# Patient Record
Sex: FEMALE | Race: WHITE | Employment: OTHER | URBAN - METROPOLITAN AREA
[De-identification: names, ages, dates, MRNs, and addresses within clinical notes are randomized per-mention and may not be internally consistent; named-entity substitution may affect disease eponyms.]

---

## 2017-02-22 ENCOUNTER — TRANSFERRED RECORDS (OUTPATIENT)
Dept: HEALTH INFORMATION MANAGEMENT | Facility: CLINIC | Age: 57
End: 2017-02-22

## 2017-03-06 ENCOUNTER — HOSPITAL ENCOUNTER (EMERGENCY)
Facility: HOSPITAL | Age: 57
Discharge: HOME | End: 2017-03-06
Attending: NURSE PRACTITIONER | Admitting: NURSE PRACTITIONER
Payer: COMMERCIAL

## 2017-03-06 VITALS
SYSTOLIC BLOOD PRESSURE: 156 MMHG | OXYGEN SATURATION: 98 % | RESPIRATION RATE: 12 BRPM | HEART RATE: 59 BPM | TEMPERATURE: 97.7 F | DIASTOLIC BLOOD PRESSURE: 98 MMHG

## 2017-03-06 DIAGNOSIS — J06.9 VIRAL UPPER RESPIRATORY TRACT INFECTION: ICD-10-CM

## 2017-03-06 DIAGNOSIS — J02.0 ACUTE STREPTOCOCCAL PHARYNGITIS: ICD-10-CM

## 2017-03-06 LAB
DEPRECATED S PYO AG THROAT QL EIA: NORMAL
MICRO REPORT STATUS: NORMAL
SPECIMEN SOURCE: NORMAL

## 2017-03-06 PROCEDURE — 87081 CULTURE SCREEN ONLY: CPT | Performed by: NURSE PRACTITIONER

## 2017-03-06 PROCEDURE — 99213 OFFICE O/P EST LOW 20 MIN: CPT

## 2017-03-06 PROCEDURE — 99213 OFFICE O/P EST LOW 20 MIN: CPT | Performed by: NURSE PRACTITIONER

## 2017-03-06 PROCEDURE — 87880 STREP A ASSAY W/OPTIC: CPT | Performed by: NURSE PRACTITIONER

## 2017-03-06 RX ORDER — AZITHROMYCIN 250 MG/1
TABLET, FILM COATED ORAL
Qty: 6 TABLET | Refills: 0 | Status: SHIPPED | OUTPATIENT
Start: 2017-03-06 | End: 2018-09-04

## 2017-03-06 ASSESSMENT — ENCOUNTER SYMPTOMS
FEVER: 0
COUGH: 1
VOICE CHANGE: 1
SORE THROAT: 1
NAUSEA: 0
DIARRHEA: 0
STRIDOR: 0
VOMITING: 0
WHEEZING: 0
SINUS PRESSURE: 0
SHORTNESS OF BREATH: 0
RHINORRHEA: 0
CHILLS: 0
ACTIVITY CHANGE: 0
DYSURIA: 0
APPETITE CHANGE: 0
TROUBLE SWALLOWING: 0

## 2017-03-06 NOTE — ED NOTES
"Patient had a \"scope to look at her vocal cords\" on Feb 24th. She notes increased irritation since then.   "

## 2017-03-06 NOTE — ED AVS SNAPSHOT
[facility]    [address]    [place] [address]    Phone:  [phone]                                       Kelly Vargas   MRN: [id number]    Department:  [facility]   Date of Visit:  3/6/2017           Patient Information     Date Of Birth          1960        Your diagnoses for this visit were:     Acute streptococcal pharyngitis        You were seen by Kirti Gibson NP.      Follow-up Information     Follow up with Bhargav Doshi MD.    Specialty:  Family Practice    Why:  As needed, If symptoms worsen    Contact information:    [facility]  [address]  [place] [address] [phone]          Follow up with [facility].    Specialty:  EMERGENCY MEDICINE    Why:  As needed, If symptoms worsen    Contact information:    [address]  [place] [address] [phone]    Additional information:    From [place]: Take [address]. Turn left at [address]. Turn left at the first stoplight on [address]. At the first stop sign, take a right onto [address]. Take a left into the parking lot and continue through until you reach the North enterance of the building.       From [place]: Take [address]. Take the [address] ramp towards [place]. Turn left onto [address]. Take a slight right onto [address]. Turn left at the first stoplight on [address]. At the first stop sign, take a right onto [address]. Take a left into the parking lot and continue through until you reach the North enterance of the building.       From [place]: Take [address]. Take a right at [address]. At the first stop sign, take a right onto [address]. Take a left into the parking lot and continue through until you reach the North enterance of the building.         Discharge Instructions       Take antibiotics as ordered.   Eat a yogurt a day while taking  antibiotics.   Increase fluid intake.   Take tylenol and or ibuprofen for fever or discomfort.   Continue to work on quitting smoking.   Follow up with ENT as scheduled. Sooner with an increase in symptoms or concerns.   Return to urgent care or emergency department with any increase in symptoms or concerns.     Discharge References/Attachments     SORE THROATS, SELF-CARE FOR (ENGLISH)    SORE THROAT, WHEN YOU HAVE A (ENGLISH)    PHARYNGITIS, STREP (PRESUMED) (ENGLISH)         Review of your medicines      START taking        Dose / Directions Last dose taken    azithromycin 250 MG tablet   Commonly known as:  ZITHROMAX Z-HUNTER   Quantity:  6 tablet        Two tablets on the first day, then one tablet daily for the next 4 days   Refills:  0          Our records show that you are taking the medicines listed below. If these are incorrect, please call your family doctor or clinic.        Dose / Directions Last dose taken    aspirin 81 MG tablet        Take  by mouth daily.   Refills:  0        cholecalciferol 5000 UNITS Caps capsule   Commonly known as:  vitamin D3   Dose:  1 capsule        Take 1 capsule by mouth daily   Refills:  0        COPAXONE 20 MG/ML injection   Quantity:  30 each   Generic drug:  glatiramer        Inject subcutaneously 20mg  once daily   Refills:  12        levothyroxine 100 MCG tablet   Commonly known as:  SYNTHROID/LEVOTHROID   Quantity:  30 tablet        Take 1 tablet by mouth  daily   Refills:  0        methylphenidate 10 MG tablet   Commonly known as:  RITALIN   Dose:  10 mg   Quantity:  60 tablet        Take 1 tablet (10 mg) by mouth 2 times daily   Refills:  0        OMEPRAZOLE PO        Refills:  0        sertraline 50 MG tablet   Commonly known as:  ZOLOFT   Quantity:  7 tablet        Take 1 tablet by mouth  daily   Refills:  0        simvastatin 80 MG tablet   Commonly known as:  ZOCOR   Quantity:  30 tablet        Take 1 tablet by mouth at  bedtime   Refills:  0               "  Prescriptions were sent or printed at these locations (1 Prescription)                   [facility] - [place], [place] - [address]   [address], [place] [address]    Telephone:  [phone]   Fax:  [phone]   Hours:                  E-Prescribed (1 of 1)         azithromycin (ZITHROMAX Z-HUNTER) 250 MG tablet                Procedures and tests performed during your visit     Beta strep group A culture    Rapid strep screen      Orders Needing Specimen Collection     None      Pending Results     Date and Time Order Name Status Description    3/6/2017 1422 Beta strep group A culture In process             Pending Culture Results     Date and Time Order Name Status Description    3/6/2017 1422 Beta strep group A culture In process             Thank you for choosing [facility]       Thank you for choosing [facility] for your care. Our goal is always to provide you with excellent care. Hearing back from our patients is one way we can continue to improve our services. Please take a few minutes to complete the written survey that you may receive in the mail after you visit with us. Thank you!        [employer] Information     [employer] lets you send messages to your doctor, view your test results, renew your prescriptions, schedule appointments and more. To sign up, go to [email or web address] . Click on \"Log in\" on the left side of the screen, which will take you to the Welcome page. Then click on \"Sign up Now\" on the right side of the page.     You will be asked to enter the access code listed below, as well as some personal information. Please follow the directions to create your username and password.     Your access code is: [email or web address]  Expires: 2017  2:56 PM     Your access code will  in 90 days. If you need help or a new code, please call your [facility] clinic or [phone].        Care EveryWhere ID     This is your Care EveryWhere ID. This could be used by other organizations to access " your [facility] medical records  [id number]        After Visit Summary       This is your record. Keep this with you and show to your community pharmacist(s) and doctor(s) at your next visit.

## 2017-03-06 NOTE — ED NOTES
Pt presents today with a very harsh voice that sounds like she is loosing it. Had a scope done on the 24th and sore throat has been even worse since then.

## 2017-03-06 NOTE — DISCHARGE INSTRUCTIONS
Take antibiotics as ordered.   Eat a yogurt a day while taking antibiotics.   Increase fluid intake.   Take tylenol and or ibuprofen for fever or discomfort.   Continue to work on quitting smoking.   Follow up with ENT as scheduled. Sooner with an increase in symptoms or concerns.   Return to urgent care or emergency department with any increase in symptoms or concerns.

## 2017-03-06 NOTE — ED PROVIDER NOTES
History     Chief Complaint   Patient presents with     Cough     Pharyngitis     Had recent scope, throat worsened.     The history is provided by the patient. No  was used.     Kelly Vargas is a 56 year old female who presents with throat pain that started 1-2 days after she was seen by ENT and had her vocal cords scoped that was done by Kizyz Gonzalez PA-C on 2-24-17 at [facility]. 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 Prior to vocal cord exam she was having a hoarse voice. She developed a sore throat 1-2 days after exam. She has taken ibuprofen with mild relief. Denies fever, chills, or night sweats. Eating and drinking well. Bowel and bladder are working well. She smokes about 1 PPD of cigarettes.     I have reviewed the Medications, Allergies, Past Medical and Surgical History, and Social History in the Epic system.    Review of Systems   Constitutional: Negative for activity change, appetite change, chills and fever.        Eating softer foods with sore throat.    HENT: Positive for congestion, ear pain, postnasal drip, sore throat and voice change. Negative for ear discharge, rhinorrhea, sinus pressure and trouble swallowing.    Respiratory: Positive for cough. Negative for shortness of breath, wheezing and stridor.    Gastrointestinal: Negative for diarrhea, nausea and vomiting.   Genitourinary: Negative for dysuria.   Skin: Negative for rash.       Physical Exam   BP: 156/98  Pulse: 59  Temp: 97.7  F (36.5  C)  Resp: 12  SpO2: 98 %  Physical Exam   Constitutional: She is oriented to person, place, and time. She appears well-developed and well-nourished. No distress.   HENT:   Right Ear: External ear normal.   Left Ear: External ear normal.   Mouth/Throat: No oropharyngeal exudate.   Oropharynx with erythema without exudate. Tonsils absent.    Neck: Normal range of motion. Neck supple.   Cardiovascular: Normal  rate, regular rhythm and normal heart sounds.    Pulmonary/Chest: Effort normal. No respiratory distress. She has no wheezes. She has no rales.   Abdominal: Soft. She exhibits no distension.   Musculoskeletal: Normal range of motion.   Lymphadenopathy:     She has no cervical adenopathy.   Neurological: She is alert and oriented to person, place, and time.   Skin: Skin is warm and dry. No rash noted. She is not diaphoretic.   Psychiatric: She has a normal mood and affect. Her behavior is normal.   Nursing note and vitals reviewed.      ED Course     ED Course     Procedures  Labs Ordered and Resulted from Time of ED Arrival Up to the Time of Departure from the ED   RAPID STREP SCREEN   BETA STREP GROUP A CULTURE     Results for orders placed or performed during the hospital encounter of 03/06/17   Rapid strep screen   Result Value Ref Range    Specimen Description Throat     Rapid Strep A Screen       NEGATIVE: No Group A streptococcal antigen detected by immunoassay, await   culture report.      Micro Report Status FINAL 03/06/2017        Assessments & Plan (with Medical Decision Making)     Discussed plan of care. She verbalized understanding. All questions answered.     I have reviewed the nursing notes.    I have reviewed the findings, diagnosis, plan and need for follow up with the patient.  Discharged in stable condition.     New Prescriptions    AZITHROMYCIN (ZITHROMAX Z-HUNTER) 250 MG TABLET    Two tablets on the first day, then one tablet daily for the next 4 days       Final diagnoses:   Acute streptococcal pharyngitis   Viral upper respiratory tract infection     Take antibiotics as ordered.   Eat a yogurt a day while taking antibiotics.   Increase fluid intake.   Take tylenol and or ibuprofen for fever or discomfort.   Continue to work on quitting smoking.   Follow up with ENT as scheduled. Sooner with an increase in symptoms or concerns.   Return to urgent care or emergency department with any increase in  symptoms or concerns.     ARTUR Sauceda  3/6/2017  2:34 PM  [facility]       Kirti Gibson NP  03/06/17 [id number]

## 2017-03-08 LAB
BACTERIA SPEC CULT: NORMAL
MICRO REPORT STATUS: NORMAL
SPECIMEN SOURCE: NORMAL

## 2017-09-10 ENCOUNTER — HEALTH MAINTENANCE LETTER (OUTPATIENT)
Age: 57
End: 2017-09-10

## 2018-08-15 ENCOUNTER — OFFICE VISIT (OUTPATIENT)
Dept: CHIROPRACTIC MEDICINE | Facility: OTHER | Age: 58
End: 2018-08-15
Attending: CHIROPRACTOR
Payer: COMMERCIAL

## 2018-08-15 DIAGNOSIS — M99.02 SEGMENTAL AND SOMATIC DYSFUNCTION OF THORACIC REGION: ICD-10-CM

## 2018-08-15 DIAGNOSIS — M99.01 SEGMENTAL AND SOMATIC DYSFUNCTION OF CERVICAL REGION: Primary | ICD-10-CM

## 2018-08-15 DIAGNOSIS — M54.2 CERVICALGIA: ICD-10-CM

## 2018-08-15 PROCEDURE — 98940 CHIROPRACT MANJ 1-2 REGIONS: CPT | Mod: AT | Performed by: CHIROPRACTOR

## 2018-08-15 NOTE — PROGRESS NOTES
Subjective Finding:    Chief compalint: Patient presents with:  Neck Pain: fingers are tingling  , Pain Scale: 5/10, Intensity: sharp, Duration: 1 years, Radiating: no.    Date of injury:     Activities that the pain restricts:   Home/household/hobbies/social activities: yes.  Work duties: yes.  Sleep: yes.  Makes symptoms better: rest.  Makes symptoms worse: activity, cervical extension and cervical flexion.  Have you seen anyone else for the symptoms? MD.  Work related: no.  Automobile related injury: no.    Objective and Assessment:    Posture Analysis:   High shoulder: .  Head tilt: .  High iliac crest: .  Head carriage: forward.  Thoracic Kyphosis: neutral.  Lumbar Lordosis: neutral.    Lumbar Range of Motion: .  Cervical Range of Motion: extension decreased, left lateral flexion decreased and right lateral flexion decreased.  Thoracic Range of Motion: .  Extremity Range of Motion: .    Palpation:   Lev scapulae: stiff, referred pain: no    Segmental dysfunction pre-treatment and treatment area: C1, C5, C6, C7 and T3.    Assessment post-treatment:  Cervical: ROM increased.  Thoracic: ROM increased.  Lumbar: .    Comments: .      Complicating Factors: .    Procedure(s):  CMT:  51250 Chiropractic manipulative treatment 1-2 regions performed   C spine    Modalities:  None performed this visit    Therapeutic procedures:  None    Plan:  Treatment plan: PRN.  Instructed patient: stretch as instructed at visit.  Short term goals: reduce pain.  Long term goals: restore normal function.  Prognosis: very good.

## 2018-08-15 NOTE — MR AVS SNAPSHOT
After Visit Summary   8/15/2018    Kelly Vargas    MRN: [id number]           Patient Information     Date Of Birth          1960        Visit Information        Provider Department      8/15/2018 1:40 PM Guanaco Lynn DC  [facility]        Today's Diagnoses     Segmental and somatic dysfunction of cervical region    -  1    Cervicalgia        Segmental and somatic dysfunction of thoracic region           Follow-ups after your visit        Who to contact     If you have questions or need follow up information about today's clinic visit or your schedule please contact  [facility] directly at [phone].  Normal or non-critical lab and imaging results will be communicated to you by MyChart, letter or phone within 4 business days after the clinic has received the results. If you do not hear from us within 7 days, please contact the clinic through MyChart or phone. If you have a critical or abnormal lab result, we will notify you by phone as soon as possible.  Submit refill requests through [employer] or call your pharmacy and they will forward the refill request to us. Please allow 3 business days for your refill to be completed.          Additional Information About Your Visit        Care EveryWhere ID     This is your Care EveryWhere ID. This could be used by other organizations to access your [facility] medical records  [id number]         Blood Pressure from Last 3 Encounters:   03/06/17 156/98   04/25/16 (!) 186/102   09/26/14 128/84    Weight from Last 3 Encounters:   09/26/14 184 lb (83.5 kg)   08/18/14 174 lb (78.9 kg)   08/21/13 170 lb (77.1 kg)              We Performed the Following     CHIROPRAC MANIP,SPINAL,1-2 REGIONS        Primary Care Provider Office Phone # Fax #    Bhargav Doshi [phone]       [facility] [facility] [address]        Equal Access to Services     CURRY GASCA AH: margareth Nye,  aaron seo teresachristian phillip bladimirin hayaan adeeg kharash la'aan ah. So [facility] [phone].    ATENCIÓN: Si darrel alfredo, tiene a manjarrez disposición servicios gratuitos de asistencia lingüística. Savita al [phone].    We comply with applicable federal civil rights laws and [place] laws. We do not discriminate on the basis of race, color, national origin, age, disability, sex, sexual orientation, or gender identity.            Thank you!     Thank you for choosing  [facility]  for your care. Our goal is always to provide you with excellent care. Hearing back from our patients is one way we can continue to improve our services. Please take a few minutes to complete the written survey that you may receive in the mail after your visit with us. Thank you!             Your Updated Medication List - Protect others around you: Learn how to safely use, store and throw away your medicines at www.disposemymeds.org.          This list is accurate as of 8/15/18  2:16 PM.  Always use your most recent med list.                   Brand Name Dispense Instructions for use Diagnosis    aspirin 81 MG tablet      Take  by mouth daily.        azithromycin 250 MG tablet    ZITHROMAX Z-HUNTER    6 tablet    Two tablets on the first day, then one tablet daily for the next 4 days        cholecalciferol 5000 units Caps capsule    vitamin D3     Take 1 capsule by mouth daily        COPAXONE 20 MG/ML injection   Generic drug:  glatiramer     30 each    Inject subcutaneously 20mg  once daily    Multiple sclerosis (H)       levothyroxine 100 MCG tablet    SYNTHROID/LEVOTHROID    30 tablet    Take 1 tablet by mouth  daily    Hypothyroidism       methylphenidate 10 MG tablet    RITALIN    60 tablet    Take 1 tablet (10 mg) by mouth 2 times daily    MS (multiple sclerosis) (H)       OMEPRAZOLE PO           sertraline 50 MG tablet    ZOLOFT    7 tablet    Take 1 tablet by mouth  daily    Major depressive disorder, recurrent episode,  moderate (H)       simvastatin 80 MG tablet    ZOCOR    30 tablet    Take 1 tablet by mouth at  bedtime    H/O hyperlipidemia

## 2018-09-04 ENCOUNTER — HOSPITAL ENCOUNTER (EMERGENCY)
Facility: HOSPITAL | Age: 58
Discharge: HOME | End: 2018-09-04
Attending: NURSE PRACTITIONER | Admitting: NURSE PRACTITIONER
Payer: COMMERCIAL

## 2018-09-04 VITALS
RESPIRATION RATE: 16 BRPM | TEMPERATURE: 98.8 F | SYSTOLIC BLOOD PRESSURE: 182 MMHG | OXYGEN SATURATION: 96 % | HEART RATE: 63 BPM | DIASTOLIC BLOOD PRESSURE: 95 MMHG

## 2018-09-04 DIAGNOSIS — S61.012A THUMB LACERATION, LEFT, INITIAL ENCOUNTER: ICD-10-CM

## 2018-09-04 PROCEDURE — G0463 HOSPITAL OUTPT CLINIC VISIT: HCPCS

## 2018-09-04 PROCEDURE — 12001 RPR S/N/AX/GEN/TRNK 2.5CM/<: CPT | Performed by: NURSE PRACTITIONER

## 2018-09-04 PROCEDURE — 12001 RPR S/N/AX/GEN/TRNK 2.5CM/<: CPT

## 2018-09-04 PROCEDURE — 40000268 ZZH STATISTIC NO CHARGES

## 2018-09-04 ASSESSMENT — ENCOUNTER SYMPTOMS
FATIGUE: 0
WOUND: 1
MYALGIAS: 0
APPETITE CHANGE: 0
FEVER: 0
ARTHRALGIAS: 0
CHILLS: 0

## 2018-09-04 NOTE — ED PROVIDER NOTES
History     Chief Complaint   Patient presents with     Laceration     left thumb laceration from an accidental knife cut     The history is provided by the patient. No  was used.     Kelly Vargas is a right hand dominate 57 year old female who presents today with a chief complaint of left thumb laceration.  She cut it on a kitchen knife while making dinner.  The bleeding is well controlled.  Tetanus is up to date as of 2015.  No acute numbness or tingling in the left thumb.  She has full ROM of the thumb    Problem List:    Patient Active Problem List    Diagnosis Date Noted     CHD (coronary heart disease) 05/21/2013     Priority: Medium     S/P stenting       Dyslipidemia 05/21/2013     Priority: Medium     MS (multiple sclerosis) (H) 05/21/2013     Priority: Medium     Obesity 05/21/2013     Priority: Medium     Tobacco abuse 05/21/2013     Priority: Medium     Hypothyroidism 05/21/2013     Priority: Medium     Depression 05/21/2013     Priority: Medium     Advanced care planning/counseling discussion 06/01/2012     Priority: Medium        Past Medical History:    Past Medical History:   Diagnosis Date     CHD, Native [place] 07/11/2007     Chronic/Recurrent Back Pain 09/20/2000     Depression 04/25/2011     Dyslipidemia 06/02/2000     Hypothyroidism 07/10/2001     Multiple sclerosis (H) 04/18/2001     Nephrolithiasis 01/24/2012     Obesity 01/01/2011     Prediabetes 01/12/2012     Rheumatoid arthritis(714.0) 01/06/2004     Tobacco Abuse 01/01/2011     Vitamin D deficiency 08/14/2012       Past Surgical History:    Past Surgical History:   Procedure Laterality Date     ANGIOPLASTY  7/2007    stent to LAD     breast reduction  2005    Bilateral     CARDIAC SURGERY      stent     CHOLECYSTECTOMY       COLONOSCOPY  6/13/2005    repeat colonoscopy in 10 years     ENT SURGERY      tonsilectomy     GYN SURGERY      tubal ligation, bilateral     ORTHOPEDIC SURGERY      bilateral carpel tunnel,  RT       Family History:    Family History   Problem Relation Age of Onset     C.A.D. Mother      C.A.D. Father      Hypertension Brother      Diabetes Sister      Myocardial Infarction Mother      myocardial infarction, cause of death     Myocardial Infarction Father      myocardial infarction, cause of death       Social History:  Marital Status:  Legally  [3]  Social History   Substance Use Topics     Smoking status: Current Every Day Smoker     Types: Cigarettes     Last attempt to quit: 8/3/2013     Smokeless tobacco: Never Used      Comment: tried to quit yes, yr quit 2011     Alcohol use Yes        Medications:      Famotidine (PEPCID PO)   Sertraline HCl (ZOLOFT PO)   aspirin 81 MG tablet   cholecalciferol (VITAMIN D3) 5000 UNITS CAPS capsule   levothyroxine (SYNTHROID,LEVOTHROID) 100 MCG tablet   simvastatin (ZOCOR) 80 MG tablet         Review of Systems   Constitutional: Negative for appetite change, chills, fatigue and fever.   Musculoskeletal: Negative for arthralgias and myalgias.   Skin: Positive for wound.       Physical Exam   BP: (!) 182/95 (asymptomatic)  Pulse: 63  Temp: 98.8  F (37.1  C)  Resp: 16  SpO2: 96 %      Physical Exam   Constitutional: She is oriented to person, place, and time. She appears well-developed.   Cardiovascular: Normal rate.    Pulmonary/Chest: Effort normal.   Musculoskeletal:        Left hand: She exhibits tenderness and laceration (1.5 cm straight laceration just above the joint line of the DIP joint of the thumb on the ventral surface into the subcutaneous fat, the edges approximate well, minimal bleeding). She exhibits normal range of motion (full ROM of thumb with normal strength to oppositional force), no bony tenderness and normal two-point discrimination. Normal sensation noted. Normal strength noted.   Neurological: She is alert and oriented to person, place, and time.   Skin: Skin is warm and dry.   Psychiatric: She has a normal mood and affect. Her  "behavior is normal.   Nursing note and vitals reviewed.      ED Course     ED Course     Laceration repair  Date/Time: 9/6/2018 8:28 PM  Performed by: REGINA WATSON  Authorized by: REGINA WATSON   Consent: Verbal consent obtained. Written consent obtained.  Risks and benefits: risks, benefits and alternatives were discussed  Consent given by: patient  Patient understanding: patient states understanding of the procedure being performed  Patient consent: the patient's understanding of the procedure matches consent given  Procedure consent: procedure consent matches procedure scheduled  Relevant documents: relevant documents present and verified  Test results available and properly labeled: n/a.  Site marked: provider in attendance at all times.  Imaging studies available: n/a.  Required items: required blood products, implants, devices, and special equipment available  Patient identity confirmed: verbally with patient and arm band  Time out: Immediately prior to procedure a \"time out\" was called to verify the correct patient, procedure, equipment, support staff and site/side marked as required.  Body area: upper extremity  Location details: left thumb  Laceration length: 1.5 cm  Foreign bodies: no foreign bodies  Tendon involvement: none  Anesthesia: digital block    Anesthesia:  Local Anesthetic: lidocaine 2% without epinephrine  Anesthetic total: 4 mL    Sedation:  Patient sedated: no  Preparation: Patient was prepped and draped in the usual sterile fashion.  Irrigation solution: saline  Irrigation method: syringe  Amount of cleaning: standard  Debridement: minimal  Skin closure: 4-0 nylon  Number of sutures: 5  Technique: simple  Approximation: close  Approximation difficulty: simple  Dressing: tube gauze and antibiotic ointment  Patient tolerance: Patient tolerated the procedure well with no immediate complications          Assessments & Plan (with Medical Decision Making)     I have reviewed the " "nursing notes.    I have reviewed the findings, diagnosis, plan and need for follow up with the patient.  ASSESSMENT / PLAN:  (S61.012A) Thumb laceration, left, initial encounter  Comment: closed with 5 sutures  Plan:  Keep clean and dry x 24 hours, then may shower as usual. Do not soak or swim   Take Tylenol per package directions for pain   Call tomorrow to make a follow up appointment for suture removal in 7 days   Watch for signs and symptoms of infection: Increasing redness, pain, warmth, fever, chills, malodor, increased drainage, and follow up here or with PCP if any arise   Patient verbally educated and discharge instructions given, verbalizes understanding, and all questions answered to the best of my ability.   Return to ED/UC if symptoms increase or concerns develop such as those discussed and listed on the \"When to go the Emergency Room\" portion of your discharge instructions.        Discharge Medication List as of 9/4/2018  7:30 PM          Final diagnoses:   Thumb laceration, left, initial encounter - closed with 5 sutures       9/4/2018   [facility]     Christie Coyne NP  09/06/18 2032    "

## 2018-09-04 NOTE — ED AVS SNAPSHOT
[facility]    [address] [address]    Phone:  [phone]                                       Kelly Vargas   MRN: [id number]    Department:  [facility]   Date of Visit:  9/4/2018           Patient Information     Date Of Birth          1960        Your diagnoses for this visit were:     Thumb laceration, left, initial encounter closed with 5 sutures       You were seen by Christie Coyne NP.      Follow-up Information     Follow up with [facility].    Specialty:  EMERGENCY MEDICINE    Why:  As needed, If symptoms worsen, or concerns develop    Contact information:    [address] [address] [phone]    Additional information:    From [place]: Take [address]. Turn left at [address]. Turn left at the first stoplight on [address]. At the first stop sign, take a right onto [address]. Take a left into the parking lot and continue through until you reach the North enterance of the building.       From [place]: Take [address]. Take the [address] ramp towards [place]. Turn left onto [address]. Take a slight right onto [address] [facility]. Turn left at the first stoplight on [address]. At the first stop sign, take a right onto [address]. Take a left into the parking lot and continue through until you reach the North enterance of the building.       From [place]: Take [address]. Take a right at [address]. At the first stop sign, take a right onto [address]. Take a left into the parking lot and continue through until you reach the North enterance of the building.         Follow up with Bhargav Doshi MD In 7 days.    Specialty:  Family Practice    Why:  For suture removal    Contact information:    [facility] [address]  [phone]          Discharge Instructions         Extremity Laceration: Suture  or Tape (Child)  A laceration is a cut through the skin. If it is large or deep, it may need stitches or staples to close the wound so it can heal. Minor cuts may be closed with surgical tape.   X-rays may be done if something may have entered the skin through the cut, such as glass or rocks. Your child may also need a tetanus shot if he or she is not up-to-date on this vaccination.  Home care  Your child s healthcare provider may prescribe an antibiotic to help prevent infection. Follow all instructions for giving this medicine to your child. Make sure your child takes the medicine every day until it is gone or told to stop.  If your child has pain, you can give him or her pain medicine as advised by the healthcare provider. Don't give your child aspirin.  In rare cases, it can cause serious problems in children 15 years of age and younger.  Don t give your child any other medicine without asking the healthcare provider first.    General care    Follow the healthcare provider s instructions on how to care for the cut.    Wash your hands with soap and warm water before and after caring for your child's cut. This is to help prevent infection.    Leave the original bandage in place for 24 hours. Replace it if it becomes wet or dirty. After 24 hours, change it once a day or as directed.    Clean the wound daily. First, remove the bandage. Then wash the area gently with soap and warm water, or as directed by your child s healthcare provider. Use a wet cotton swab to loosen and remove any blood or crust that forms. After cleaning, apply a thin layer of antibiotic ointment, if advised. Then put on a new bandage.    Caring for sutures or staples: Clean the wound daily. First, remove the bandage. Then wash the area gently with soap and warm water, or as directed by your child s provider. Use a wet cotton swab to loosen and remove any blood or crust that forms. After cleaning, apply a thin layer of antibiotic ointment, if  advised. Then put on a new bandage.    Caring for surgical tape: Keep the area dry. If it gets wet, blot it dry with a clean towel. Surgical tape usually falls off within 7 to 10 days. If it has not fallen off after 10 days, you can take it off yourself. Put mineral oil or petroleum jelly on a cotton ball and gently rub the tape until it is removed.    Explain to your child in an age appropriate way what you are doing as you care for the wound. Let your child help when possible. For example, have him or her hand you the towel or pat the area dry.    Make sure your child does not scratch, rub, or pick at the area. A baby may need to wear scratch mittens.    Don't soak the cut in water. Have your child shower or take sponge baths instead of tub baths. Don t let your child go swimming.     If the area gets wet, gently pat it dry with a clean cloth. Replace the wet bandage with a dry one.  Follow-up care  Follow up with your child s healthcare provider. Make a follow-up appointment to have the stitches or staples removed, if directed.  Special note to parents  Healthcare providers are trained to see injuries such as this in young children as a sign of possible abuse. You may be asked questions about how your child was injured. Health care providers are required by law to ask you these questions. This is done to protect your child. Please try to be patient.  When to seek medical advice  Call the child's healthcare provider for any of the following:    Wound bleeding is not controlled by direct pressure    Signs of infection develop, including increasing pain in the wound, increasing wound redness or swelling, or pus or bad odor coming from the wound    Fever of 100.4 F (38 C) or higher, or as directed by the child's healthcare provider     Stitches or staples come apart or fall out or surgical tape falls off before 7 days    Wound edges re-open    Wound changes colors    Numbness occurs around the wound     Decreased  movement occurs around the injured area  Date Last Reviewed: 8/1/2017 2000-2017 [employer]. [address] [place] [address], [place] [address]. All rights reserved. This information is not intended as a substitute for professional medical care. Always follow your healthcare professional's instructions.             Review of your medicines      Our records show that you are taking the medicines listed below. If these are incorrect, please call your family doctor or clinic.        Dose / Directions Last dose taken    aspirin 81 MG tablet        Take  by mouth daily.   Refills:  0        cholecalciferol 5000 units Caps capsule   Commonly known as:  vitamin D3   Dose:  1 capsule        Take 1 capsule by mouth daily   Refills:  0        levothyroxine 100 MCG tablet   Commonly known as:  SYNTHROID/LEVOTHROID   Quantity:  30 tablet        Take 1 tablet by mouth  daily   Refills:  0        PEPCID PO   Dose:  20 mg        Take 20 mg by mouth daily as needed for heartburn   Refills:  0        simvastatin 80 MG tablet   Commonly known as:  ZOCOR   Quantity:  30 tablet        Take 1 tablet by mouth at  bedtime   Refills:  0        ZOLOFT PO   Dose:  100 mg        Take 100 mg by mouth daily   Refills:  0                Orders Needing Specimen Collection     None      Pending Results     No orders found from 9/2/2018 to 9/5/2018.            Pending Culture Results     No orders found from 9/2/2018 to 9/5/2018.            Thank you for choosing [facility]       Thank you for choosing [facility] for your care. Our goal is always to provide you with excellent care. Hearing back from our patients is one way we can continue to improve our services. Please take a few minutes to complete the written survey that you may receive in the mail after you visit with us. Thank you!        Care EveryWhere ID     This is your Care EveryWhere ID. This could be used by other organizations to access your [facility] medical records  [id number]         Equal Access to Services     CURRY GASCA : Franco Calloway, margareth berger, christian schroeder. So [facility] [phone].    ATENCIÓN: Si habla español, tiene a manjarrez disposición servicios gratuitos de asistencia lingüística. Llame al [phone].    We comply with applicable federal civil rights laws and [place] laws. We do not discriminate on the basis of race, color, national origin, age, disability, sex, sexual orientation, or gender identity.            After Visit Summary       This is your record. Keep this with you and show to your community pharmacist(s) and doctor(s) at your next visit.

## 2018-09-04 NOTE — ED AVS SNAPSHOT
[facility]    [address] [place] [address]    Phone:  [phone]                                       Kelly Vargas   MRN: [id number]    Department[facility]  [facility]   Date of Visit:  9/4/2018           After Visit Summary Signature Page     I have received my discharge instructions, and my questions have been answered. I have discussed any challenges I see with this plan with the nurse or doctor.    ..........................................................................................................................................  Patient/Patient Representative Signature      ..........................................................................................................................................  Patient Representative Print Name and Relationship to Patient    ..................................................               ................................................  Date                                            Time    ..........................................................................................................................................  Reviewed by Signature/Title    ...................................................              ..............................................  Date                                                            Time          22EPIC Rev 08/18

## 2018-09-05 NOTE — DISCHARGE INSTRUCTIONS
Extremity Laceration: Suture or Tape (Child)  A laceration is a cut through the skin. If it is large or deep, it may need stitches or staples to close the wound so it can heal. Minor cuts may be closed with surgical tape.   X-rays may be done if something may have entered the skin through the cut, such as glass or rocks. Your child may also need a tetanus shot if he or she is not up-to-date on this vaccination.  Home care  Your child s healthcare provider may prescribe an antibiotic to help prevent infection. Follow all instructions for giving this medicine to your child. Make sure your child takes the medicine every day until it is gone or told to stop.  If your child has pain, you can give him or her pain medicine as advised by the healthcare provider. Don't give your child aspirin.  In rare cases, it can cause serious problems in children 15 years of age and younger.  Don t give your child any other medicine without asking the healthcare provider first.    General care    Follow the healthcare provider s instructions on how to care for the cut.    Wash your hands with soap and warm water before and after caring for your child's cut. This is to help prevent infection.    Leave the original bandage in place for 24 hours. Replace it if it becomes wet or dirty. After 24 hours, change it once a day or as directed.    Clean the wound daily. First, remove the bandage. Then wash the area gently with soap and warm water, or as directed by your child s healthcare provider. Use a wet cotton swab to loosen and remove any blood or crust that forms. After cleaning, apply a thin layer of antibiotic ointment, if advised. Then put on a new bandage.    Caring for sutures or staples: Clean the wound daily. First, remove the bandage. Then wash the area gently with soap and warm water, or as directed by your child s provider. Use a wet cotton swab to loosen and remove any blood or crust that forms. After cleaning, apply a thin  layer of antibiotic ointment, if advised. Then put on a new bandage.    Caring for surgical tape: Keep the area dry. If it gets wet, blot it dry with a clean towel. Surgical tape usually falls off within 7 to 10 days. If it has not fallen off after 10 days, you can take it off yourself. Put mineral oil or petroleum jelly on a cotton ball and gently rub the tape until it is removed.    Explain to your child in an age appropriate way what you are doing as you care for the wound. Let your child help when possible. For example, have him or her hand you the towel or pat the area dry.    Make sure your child does not scratch, rub, or pick at the area. A baby may need to wear scratch mittens.    Don't soak the cut in water. Have your child shower or take sponge baths instead of tub baths. Don t let your child go swimming.     If the area gets wet, gently pat it dry with a clean cloth. Replace the wet bandage with a dry one.  Follow-up care  Follow up with your child s healthcare provider. Make a follow-up appointment to have the stitches or staples removed, if directed.  Special note to parents  Healthcare providers are trained to see injuries such as this in young children as a sign of possible abuse. You may be asked questions about how your child was injured. Health care providers are required by law to ask you these questions. This is done to protect your child. Please try to be patient.  When to seek medical advice  Call the child's healthcare provider for any of the following:    Wound bleeding is not controlled by direct pressure    Signs of infection develop, including increasing pain in the wound, increasing wound redness or swelling, or pus or bad odor coming from the wound    Fever of 100.4 F (38 C) or higher, or as directed by the child's healthcare provider     Stitches or staples come apart or fall out or surgical tape falls off before 7 days    Wound edges re-open    Wound changes colors    Numbness occurs  around the wound     Decreased movement occurs around the injured area  Date Last Reviewed: 8/1/2017 2000-2017 [employer]. [address] [facility], [place] [address]. All rights reserved. This information is not intended as a substitute for professional medical care. Always follow your healthcare professional's instructions.

## 2019-06-05 ENCOUNTER — MEDICAL CORRESPONDENCE (OUTPATIENT)
Dept: HEALTH INFORMATION MANAGEMENT | Facility: CLINIC | Age: 59
End: 2019-06-05

## 2019-07-10 ENCOUNTER — HOSPITAL ENCOUNTER (EMERGENCY)
Facility: HOSPITAL | Age: 59
Discharge: HOME | End: 2019-07-10
Attending: PHYSICIAN ASSISTANT | Admitting: PHYSICIAN ASSISTANT
Payer: COMMERCIAL

## 2019-07-10 ENCOUNTER — NURSE TRIAGE (OUTPATIENT)
Dept: FAMILY MEDICINE | Facility: OTHER | Age: 59
End: 2019-07-10

## 2019-07-10 VITALS
SYSTOLIC BLOOD PRESSURE: 135 MMHG | TEMPERATURE: 99.4 F | RESPIRATION RATE: 16 BRPM | DIASTOLIC BLOOD PRESSURE: 90 MMHG | HEART RATE: 75 BPM | OXYGEN SATURATION: 96 %

## 2019-07-10 DIAGNOSIS — J06.9 URI (UPPER RESPIRATORY INFECTION): ICD-10-CM

## 2019-07-10 LAB
DEPRECATED S PYO AG THROAT QL EIA: NORMAL
SPECIMEN SOURCE: NORMAL

## 2019-07-10 PROCEDURE — 87081 CULTURE SCREEN ONLY: CPT | Performed by: FAMILY MEDICINE

## 2019-07-10 PROCEDURE — 87880 STREP A ASSAY W/OPTIC: CPT | Performed by: FAMILY MEDICINE

## 2019-07-10 PROCEDURE — G0463 HOSPITAL OUTPT CLINIC VISIT: HCPCS

## 2019-07-10 PROCEDURE — 99213 OFFICE O/P EST LOW 20 MIN: CPT | Mod: Z6 | Performed by: PHYSICIAN ASSISTANT

## 2019-07-10 NOTE — TELEPHONE ENCOUNTER
"Patient called stating she has been experiencing a sore throat and hoarse voice since Monday. Patient states she has had to call into work for 3 days due to symptoms. Patient offered appointment in [facility] on 07/11/19.  Patient refused stating she wanted to get back to work. Patient advised to be seen in urgent care due to clinic schedules being full. Patient understood and agreed with recommendation.    Reason for Disposition    SEVERE (e.g., excruciating) throat pain    Additional Information    Negative: Severe difficulty breathing (e.g., struggling for each breath, speaks in single words, stridor)    Negative: Sounds like a life-threatening emergency to the triager    Negative: [1] Diagnosed strep throat AND [2] taking antibiotic AND [3] symptoms continue    Negative: Throat culture results, call about    Negative: Productive cough is main symptom    Negative: Non-productive cough is main symptom    Negative: Hoarseness is main symptom    Negative: Runny nose is main symptom    Negative: [1] Drooling or spitting out saliva (because can't swallow) AND [2] normal breathing    Negative: Unable to open mouth completely    Negative: [1] Difficulty breathing AND [2] not severe    Negative: Fever > 104 F (40 C)    Negative: [1] Refuses to drink anything AND [2] for > 12 hours    Negative: [1] Drinking very little AND [2] dehydration suspected (e.g., no urine > 12 hours, very dry mouth, very lightheaded)    Negative: Patient sounds very sick or weak to the triager    Answer Assessment - Initial Assessment Questions  1. ONSET: \"When did the throat start hurting?\" (Hours or days ago)       Started Monday  2. SEVERITY: \"How bad is the sore throat?\" (Scale 1-10; mild, moderate or severe)    - MILD (1-3):  doesn't interfere with eating or normal activities    - MODERATE (4-7): interferes with eating some solids and normal activities    - SEVERE (8-10):  excruciating pain, interferes with most normal activities    - SEVERE " "DYSPHAGIA: can't swallow liquids, drooling      Mild with pain  3. STREP EXPOSURE: \"Has there been any exposure to strep within the past week?\" If so, ask: \"What type of contact occurred?\"       Not that patient is aware of  4. VIRAL SYMPTOMS: \"Are there any symptoms of a cold, such as a runny nose, cough, hoarse voice or red eyes?\"       Congestion, slight cough and hoarse voice  5. FEVER: \"Do you have a fever?\" If so, ask: \"What is your temperature, how was it measured, and when did it start?\"      Patient doesn't have a thermometer but patient believes she has a fever.  6. PUS ON THE TONSILS: \"Is there pus on the tonsils in the back of your throat?\"      Unable to look at throat  7. OTHER SYMPTOMS: \"Do you have any other symptoms?\" (e.g., difficulty breathing, headache, rash)      no  8. PREGNANCY: \"Is there any chance you are pregnant?\" \"When was your last menstrual period?\"      no    Protocols used: SORE THROAT-A-AH      "

## 2019-07-10 NOTE — ED AVS SNAPSHOT
[facility]  [address] [place] [address]  Phone:  [phone]                                    Kelly Vargas   MRN: [id number]    Department[facility]  [facility]   Date of Visit:  7/10/2019           After Visit Summary Signature Page    I have received my discharge instructions, and my questions have been answered. I have discussed any challenges I see with this plan with the nurse or doctor.    ..........................................................................................................................................  Patient/Patient Representative Signature      ..........................................................................................................................................  Patient Representative Print Name and Relationship to Patient    ..................................................               ................................................  Date                                   Time    ..........................................................................................................................................  Reviewed by Signature/Title    ...................................................              ..............................................  Date                                               Time          22EPIC Rev 08/18

## 2019-07-10 NOTE — ED TRIAGE NOTES
Pt is here today with c/o sore throat, laryngitis, headache, sour taste in mouth, clear nasal drainage. Onset 07/08/19. Tylenol was taken this am.

## 2019-07-11 ASSESSMENT — ENCOUNTER SYMPTOMS
SINUS PAIN: 0
HEADACHES: 1
DIARRHEA: 0
FEVER: 0
VOMITING: 0
NAUSEA: 0
RHINORRHEA: 1
SORE THROAT: 1
COUGH: 0
SHORTNESS OF BREATH: 0

## 2019-07-11 NOTE — ED PROVIDER NOTES
History     Chief Complaint   Patient presents with     Pharyngitis     HPI  Kelly Vargas is a 58 year old female who presents to urgent care for evaluation of sore throat.  Patient states over the past 3 days she has had a sore throat, and laryngitis.  Has also had mild pain in both ears.  She denies any known fevers, coughing, nausea, vomiting, diarrhea, or any other associated symptoms.  Patient states that she is a pack per day smoker for 25+ years.  Patient has not taken anything over-the-counter.    Allergies:  Allergies   Allergen Reactions     Plaquenil [Hydroxychloroquine Sulfate] Rash       Problem List:    Patient Active Problem List    Diagnosis Date Noted     CHD (coronary heart disease) 05/21/2013     Priority: Medium     S/P stenting       Dyslipidemia 05/21/2013     Priority: Medium     MS (multiple sclerosis) (H) 05/21/2013     Priority: Medium     Obesity 05/21/2013     Priority: Medium     Tobacco abuse 05/21/2013     Priority: Medium     Hypothyroidism 05/21/2013     Priority: Medium     Depression 05/21/2013     Priority: Medium     Advanced care planning/counseling discussion 06/01/2012     Priority: Medium        Past Medical History:    Past Medical History:   Diagnosis Date     CHD, [place] 07/11/2007     Chronic/Recurrent Back Pain 09/20/2000     Depression 04/25/2011     Dyslipidemia 06/02/2000     Hypothyroidism 07/10/2001     Multiple sclerosis (H) 04/18/2001     Nephrolithiasis 01/24/2012     Obesity 01/01/2011     Prediabetes 01/12/2012     Rheumatoid arthritis(714.0) 01/06/2004     Tobacco Abuse 01/01/2011     Vitamin D deficiency 08/14/2012       Past Surgical History:    Past Surgical History:   Procedure Laterality Date     ANGIOPLASTY  7/2007    stent to LAD     breast reduction  2005    Bilateral     CARDIAC SURGERY      stent     CHOLECYSTECTOMY       COLONOSCOPY  6/13/2005    repeat colonoscopy in 10 years     ENT SURGERY      tonsilectomy     GYN SURGERY       tubal ligation, bilateral     ORTHOPEDIC SURGERY      bilateral carpel tunnel, RT       Family History:    Family History   Problem Relation Age of Onset     C.A.D. Mother      C.A.D. Father      Hypertension Brother      Diabetes Sister      Myocardial Infarction Mother         myocardial infarction, cause of death     Myocardial Infarction Father         myocardial infarction, cause of death       Social History:  Marital Status:  Legally  [3]  Social History     Tobacco Use     Smoking status: Current Every Day Smoker     Types: Cigarettes     Last attempt to quit: 8/3/2013     Years since quittin.9     Smokeless tobacco: Never Used     Tobacco comment: tried to quit yes, yr quit    Substance Use Topics     Alcohol use: Yes     Drug use: Not on file        Medications:      aspirin 81 MG tablet   cholecalciferol (VITAMIN D3) 5000 UNITS CAPS capsule   levothyroxine (SYNTHROID,LEVOTHROID) 100 MCG tablet   Sertraline HCl (ZOLOFT PO)   simvastatin (ZOCOR) 80 MG tablet   Famotidine (PEPCID PO)         Review of Systems   Constitutional: Negative for fever.   HENT: Positive for ear pain, rhinorrhea and sore throat. Negative for sinus pain.    Respiratory: Negative for cough and shortness of breath.    Gastrointestinal: Negative for diarrhea, nausea and vomiting.   Neurological: Positive for headaches.       Physical Exam   BP: 135/90  Pulse: 75  Temp: 99.4  F (37.4  C)  Resp: 16  SpO2: 96 %      Physical Exam   Constitutional: She appears well-developed and well-nourished. She does not appear ill. No distress.   HENT:   Head: Normocephalic.   Right Ear: Tympanic membrane normal.   Left Ear: Tympanic membrane normal.   Mouth/Throat: Uvula is midline and oropharynx is clear and moist. No uvula swelling.   Eyes: Pupils are equal, round, and reactive to light.   Neck: Normal range of motion.   Cardiovascular: Normal heart sounds.   Pulmonary/Chest: Effort normal and breath sounds normal. No stridor. No  respiratory distress. She has no wheezes. She has no rhonchi. She has no rales.   Nursing note and vitals reviewed.      ED Course        Procedures              Critical Care time:               Results for orders placed or performed during the hospital encounter of 07/10/19 (from the past 24 hour(s))   Rapid strep screen   Result Value Ref Range    Specimen Description Throat     Rapid Strep A Screen       NEGATIVE: No Group A streptococcal antigen detected by immunoassay, await culture report.   Beta strep group A culture   Result Value Ref Range    Specimen Description Throat     Culture Micro Culture in progress        Medications - No data to display    Assessments & Plan (with Medical Decision Making)   #1.  URI    Discussed exam findings with patient.  Supportive cares are discussed at length.  Any further concerns follow-up with primary care provider.  Any emergent concern follow-up in the ED.  Patient verbalizes understanding and agreement of plan.        I have reviewed the nursing notes.    I have reviewed the findings, diagnosis, plan and need for follow up with the patient.         Medication List      There are no discharge medications for this visit.         Final diagnoses:   URI (upper respiratory infection)       7/10/2019   [facility]     Lee Fragoso PA-C  07/11/19 1086

## 2019-07-12 LAB
BACTERIA SPEC CULT: NORMAL
SPECIMEN SOURCE: NORMAL

## 2019-08-02 ENCOUNTER — TELEPHONE (OUTPATIENT)
Dept: FAMILY MEDICINE | Facility: OTHER | Age: 59
End: 2019-08-02

## 2019-08-02 NOTE — TELEPHONE ENCOUNTER
PCP Glenda     Faxed FMLA forms to clinic and she is requesting that sent back to [facility]   States she put all the information on the 1st page

## 2019-11-12 DIAGNOSIS — F32.A DEPRESSION, UNSPECIFIED DEPRESSION TYPE: Primary | ICD-10-CM

## 2019-11-13 NOTE — TELEPHONE ENCOUNTER
sertraline (ZOLOFT) 50 MG tablet      Last Written Prescription Date:  Medication is historical  Last Fill Quantity: NA,   # refills: NA  Last Office Visit: Last appointment here was in 2014. Patient has an appointment with you on 11/27/19. Please advise, Thank you.  Future Office visit:    Next 5 appointments (look out 90 days)    Nov 27, 2019  9:40 AM CST  (Arrive by 9:25 AM)  PHYSICAL with Samina Armendariz NP  [facility] ([facility] ) [address]  [place] [address]  [phone]           Routing refill request to provider for review/approval because:  Medication is reported/historical

## 2019-11-26 PROBLEM — M65.319 TRIGGER FINGER OF THUMB, UNSPECIFIED LATERALITY: Status: ACTIVE | Noted: 2017-03-15

## 2020-06-17 NOTE — PROGRESS NOTES
"Kelly Vargas is a 59 year old female who is being evaluated via a billable telephone visit.      The patient has been notified of following:     \"This telephone visit will be conducted via a call between you and your physician/provider. We have found that certain health care needs can be provided without the need for a physical exam.  This service lets us provide the care you need with a short phone conversation.  If a prescription is necessary we can send it directly to your pharmacy.  If lab work is needed we can place an order for that and you can then stop by our lab to have the test done at a later time.    Telephone visits are billed at different rates depending on your insurance coverage. During this emergency period, for some insurers they may be billed the same as an in-person visit.  Please reach out to your insurance provider with any questions.    If during the course of the call the physician/provider feels a telephone visit is not appropriate, you will not be charged for this service.\"    Patient has given verbal consent for Telephone visit?  Yes    What phone number would you like to be contacted at? [phone]    How would you like to obtain your AVS? Mail a copy    Subjective     Kelly Vargas is a 59 year old female who presents via phone visit today for the following health issues:    HPI  Depression and Anxiety Follow-Up    How are you doing with your depression since your last visit? No change, stable    How are you doing with your anxiety since your last visit?  Worsened     Are you having other symptoms that might be associated with depression or anxiety? No    Have you had a significant life event? Health Concerns and OTHER: possible detention/disability     Do you have any concerns with your use of alcohol or other drugs? No     No thoughts of suicide.    She works for [employer] and notes that her job is changing. She does not like it. She is often on the phone all day and never " "gets a . Feels this is making her MS worse. Recently applied for disability.     Currently taking sertraline without side effects. Feels it is helping. Would like to continue.     Hypothyroidism Follow-up      Since last visit, patient describes the following symptoms: fatigue, no rashes, no bowel changes, no hair loss    TSH was last checked in 2018 and it was normal.     She was taking 100 mcg Synthroid daily, but stopped over a year ago. She notes that she is a \"bad girl\" and just stopped it.     Patient also has MS. Last saw neurology in 2019. On Copaxone. She has a follow up on 7/3/20. She has the most numbness and tingling in her hands. She had EMGs to r/o carpal tunnel 9 months ago and they were normal. She did see Dr. Meena Stubbs who did give her a right carpal tunnel injection. Patient unsure if this helped. Some fatigue. On Ritalin. Unsure if it is helping. Some vertigo r/t her MS.     She does have a hoarse voice. She saw ENT in 2018. No red flags noted. Smoking cessation was recommended. Currently smoking 1 ppd. Drinking 2 glasses of wine every other day. No fevers. No unintentional weight loss. 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 Taking pepcid.     Due for mammogram and colonoscopy. Declines.     Social History     Tobacco Use     Smoking status: Current Every Day Smoker     Types: Cigarettes     Last attempt to quit: 8/3/2013     Years since quittin.8     Smokeless tobacco: Never Used     Tobacco comment: tried to quit yes, yr quit    Substance Use Topics     Alcohol use: Yes     Drug use: Not on file         How many servings of fruits and vegetables do you eat daily?  0-1    On average, how many sweetened beverages do you drink each day (Examples: soda, juice, sweet tea, etc.  Do NOT count diet or artificially sweetened beverages)?   0    How many days per week do you exercise enough to make your heart beat faster? 3 or " less    How many minutes a day do you exercise enough to make your heart beat faster? 9 or less    How many days per week do you miss taking your medication? 0       Patient Active Problem List   Diagnosis     CHD (coronary heart disease)     Dyslipidemia     MS (multiple sclerosis) (H)     Obesity     Tobacco abuse     Hypothyroidism     Depression     Advanced care planning/counseling discussion     Alcohol consumption binge drinking     Ataxia     Atypical squamous cells of undetermined significance on cytologic smear of cervix (ASC-US)     Chronic neck and back pain     Degenerative cervical spinal stenosis     Elevated LFTs     Positive MELVI (antinuclear antibody)     Post-menopausal     Pure hypercholesterolemia     Trigger finger of thumb, unspecified laterality     White matter abnormality on MRI of brain     Past Surgical History:   Procedure Laterality Date     ANGIOPLASTY  2007    stent to LAD     breast reduction      Bilateral     CARDIAC SURGERY      stent     CHOLECYSTECTOMY       COLONOSCOPY  2005    repeat colonoscopy in 10 years     ENT SURGERY      tonsilectomy     GYN SURGERY      tubal ligation, bilateral     ORTHOPEDIC SURGERY      bilateral carpel tunnel, RT       Social History     Tobacco Use     Smoking status: Current Every Day Smoker     Types: Cigarettes     Last attempt to quit: 8/3/2013     Years since quittin.8     Smokeless tobacco: Never Used     Tobacco comment: tried to quit yes, yr quit    Substance Use Topics     Alcohol use: Yes     Family History   Problem Relation Age of Onset     C.A.D. Mother      Myocardial Infarction Mother         myocardial infarction, cause of death     C.A.D. Father      Myocardial Infarction Father         myocardial infarction, cause of death     Hypertension Brother      Diabetes Sister          Current Outpatient Medications   Medication Sig Dispense Refill     aspirin 81 MG tablet Take  by mouth daily.       cholecalciferol  (VITAMIN D3) 5000 UNITS CAPS capsule Take 1 capsule by mouth daily       methylphenidate (RITALIN) 10 MG tablet Take 10 mg by mouth 2 times daily       Sertraline HCl (ZOLOFT PO) Take 100 mg by mouth daily       Famotidine (PEPCID PO) Take 20 mg by mouth daily as needed for heartburn       levothyroxine (SYNTHROID,LEVOTHROID) 100 MCG tablet Take 1 tablet by mouth  daily (Patient not taking: Reported on 6/19/2020) 30 tablet 0     simvastatin (ZOCOR) 80 MG tablet Take 1 tablet by mouth at  bedtime (Patient not taking: Reported on 6/19/2020) 30 tablet 0     Allergies   Allergen Reactions     Plaquenil [Hydroxychloroquine Sulfate] Rash       /Reviewed and updated as needed this visit by Provider         Review of Systems   As noted in the HPI.        Objective   Reported vitals:  There were no vitals taken for this visit.   healthy, alert and no distress  PSYCH: Alert and oriented times 3; coherent speech, normal   rate and volume, able to articulate logical thoughts, able   to abstract reason, no tangential thoughts, no hallucinations   or delusions  Her affect is normal  RESP: No cough, no audible wheezing, able to talk in full sentences  Remainder of exam unable to be completed due to telephone visits    Diagnostic Test Results:  none         Assessment/Plan:  1. MS (multiple sclerosis) (H)  On Copaxone. F/u with neurology.     2. Adjustment disorder with mixed anxiety and depressed mood  Stable. Continue sertraline. F/up 6 months, sooner with new or worsening symptoms. Declines counseling.     - sertraline (ZOLOFT) 100 MG tablet; Take 1 tablet (100 mg) by mouth daily  Dispense: 90 tablet; Refill: 1    3. Hypothyroidism, unspecified type  Over due for TSH. Ordered. Will notify patient of the results when available and intervene accordingly. She has not been taking her Synthroid which is most likely why she is so fatigued.     - TSH with free T4 reflex; Future    4. Dyslipidemia  H/O high cholesterol. Was on statin,  but stopped. Reasons unclear why. Lipid panel pending. Will notify patient of the results when available and intervene accordingly.     - Comprehensive metabolic panel (BMP + Alb, Alk Phos, ALT, AST, Total. Bili, TP); Future  - Lipid Profile; Future    5. Fatigue, unspecified type  Most likely multifactorial, but I feel her hypothyroidism is playing a large role as she has stopped her Synthroid. TSH ordered. Will notify patient of the results when available and intervene accordingly.   Will also run CMP and CBC. PArt of her fatigued may also be caused by her MS. Has follow up scheduled with neurology.     - Comprehensive metabolic panel (BMP + Alb, Alk Phos, ALT, AST, Total. Bili, TP); Future  - TSH with free T4 reflex; Future  - CBC with platelets and differential; Future    6. (R49.0) Hoarseness of voice  Plan: Has been evaluated by ENT. No concerns noted. Follow up with them with new or worsening symptoms.         No follow-ups on file.      Phone call duration:  17 minutes    Samina Armendariz NP

## 2020-06-19 ENCOUNTER — VIRTUAL VISIT (OUTPATIENT)
Dept: FAMILY MEDICINE | Facility: OTHER | Age: 60
End: 2020-06-19
Attending: NURSE PRACTITIONER
Payer: COMMERCIAL

## 2020-06-19 DIAGNOSIS — G35 MS (MULTIPLE SCLEROSIS) (H): Primary | ICD-10-CM

## 2020-06-19 DIAGNOSIS — E78.5 DYSLIPIDEMIA: ICD-10-CM

## 2020-06-19 DIAGNOSIS — R53.83 FATIGUE, UNSPECIFIED TYPE: ICD-10-CM

## 2020-06-19 DIAGNOSIS — R49.0 HOARSENESS OF VOICE: ICD-10-CM

## 2020-06-19 DIAGNOSIS — F43.23 ADJUSTMENT DISORDER WITH MIXED ANXIETY AND DEPRESSED MOOD: ICD-10-CM

## 2020-06-19 DIAGNOSIS — E03.9 HYPOTHYROIDISM, UNSPECIFIED TYPE: ICD-10-CM

## 2020-06-19 PROCEDURE — 99214 OFFICE O/P EST MOD 30 MIN: CPT | Mod: 95 | Performed by: NURSE PRACTITIONER

## 2020-06-19 RX ORDER — SERTRALINE HYDROCHLORIDE 100 MG/1
100 TABLET, FILM COATED ORAL DAILY
Qty: 90 TABLET | Refills: 1 | Status: SHIPPED | OUTPATIENT
Start: 2020-06-19 | End: 2021-05-26

## 2020-06-19 RX ORDER — GLATIRAMER ACETATE 20 MG/ML
40 INJECTION, SOLUTION SUBCUTANEOUS DAILY
COMMUNITY
Start: 2020-06-19 | End: 2021-05-27

## 2020-06-19 RX ORDER — METHYLPHENIDATE HYDROCHLORIDE 10 MG/1
10 TABLET ORAL PRN
COMMUNITY

## 2020-06-19 ASSESSMENT — ANXIETY QUESTIONNAIRES
3. WORRYING TOO MUCH ABOUT DIFFERENT THINGS: MORE THAN HALF THE DAYS
1. FEELING NERVOUS, ANXIOUS, OR ON EDGE: NEARLY EVERY DAY
7. FEELING AFRAID AS IF SOMETHING AWFUL MIGHT HAPPEN: NOT AT ALL
5. BEING SO RESTLESS THAT IT IS HARD TO SIT STILL: NOT AT ALL
6. BECOMING EASILY ANNOYED OR IRRITABLE: NEARLY EVERY DAY
2. NOT BEING ABLE TO STOP OR CONTROL WORRYING: MORE THAN HALF THE DAYS
GAD7 TOTAL SCORE: 10

## 2020-06-19 ASSESSMENT — PATIENT HEALTH QUESTIONNAIRE - PHQ9
5. POOR APPETITE OR OVEREATING: NOT AT ALL
SUM OF ALL RESPONSES TO PHQ QUESTIONS 1-9: 4

## 2020-06-19 NOTE — NURSING NOTE
"Chief Complaint   Patient presents with     Patient Request     MS concerns       Initial There were no vitals taken for this visit. Estimated body mass index is 37.8 kg/m  as calculated from the following:    Height as of 9/26/14: 1.486 m (4' 10.5\").    Weight as of 9/26/14: 83.5 kg (184 lb).  Medication Reconciliation: complete  Mariel Urbina LPN    "

## 2020-06-20 ASSESSMENT — ANXIETY QUESTIONNAIRES: GAD7 TOTAL SCORE: 10

## 2020-06-30 ENCOUNTER — TELEPHONE (OUTPATIENT)
Dept: FAMILY MEDICINE | Facility: OTHER | Age: 60
End: 2020-06-30

## 2020-06-30 DIAGNOSIS — E03.9 HYPOTHYROIDISM, UNSPECIFIED TYPE: ICD-10-CM

## 2020-06-30 DIAGNOSIS — R73.9 HYPERGLYCEMIA: ICD-10-CM

## 2020-06-30 DIAGNOSIS — D69.6 THROMBOCYTOPENIA (H): ICD-10-CM

## 2020-06-30 DIAGNOSIS — E03.9 HYPOTHYROIDISM: ICD-10-CM

## 2020-06-30 DIAGNOSIS — E78.5 DYSLIPIDEMIA: ICD-10-CM

## 2020-06-30 DIAGNOSIS — R53.83 FATIGUE, UNSPECIFIED TYPE: ICD-10-CM

## 2020-06-30 DIAGNOSIS — D69.6 THROMBOCYTOPENIA (H): Primary | ICD-10-CM

## 2020-06-30 LAB
ALBUMIN SERPL-MCNC: 3.4 G/DL (ref 3.4–5)
ALP SERPL-CCNC: 109 U/L (ref 40–150)
ALT SERPL W P-5'-P-CCNC: 48 U/L (ref 0–50)
ANION GAP SERPL CALCULATED.3IONS-SCNC: 4 MMOL/L (ref 3–14)
AST SERPL W P-5'-P-CCNC: 48 U/L (ref 0–45)
BASOPHILS # BLD AUTO: 0 10E9/L (ref 0–0.2)
BASOPHILS NFR BLD AUTO: 0.4 %
BILIRUB SERPL-MCNC: 1.4 MG/DL (ref 0.2–1.3)
BUN SERPL-MCNC: 8 MG/DL (ref 7–30)
CALCIUM SERPL-MCNC: 9 MG/DL (ref 8.5–10.1)
CHLORIDE SERPL-SCNC: 108 MMOL/L (ref 94–109)
CHOLEST SERPL-MCNC: 308 MG/DL
CO2 SERPL-SCNC: 29 MMOL/L (ref 20–32)
COPATH REPORT: NORMAL
CREAT SERPL-MCNC: 0.62 MG/DL (ref 0.52–1.04)
DIFFERENTIAL METHOD BLD: ABNORMAL
EOSINOPHIL # BLD AUTO: 0.2 10E9/L (ref 0–0.7)
EOSINOPHIL NFR BLD AUTO: 3 %
ERYTHROCYTE [DISTWIDTH] IN BLOOD BY AUTOMATED COUNT: 13.9 % (ref 10–15)
EST. AVERAGE GLUCOSE BLD GHB EST-MCNC: 100 MG/DL
GFR SERPL CREATININE-BSD FRML MDRD: >90 ML/MIN/{1.73_M2}
GLUCOSE SERPL-MCNC: 123 MG/DL (ref 70–99)
HBA1C MFR BLD: 5.1 % (ref 0–5.6)
HCT VFR BLD AUTO: 42.9 % (ref 35–47)
HDLC SERPL-MCNC: 58 MG/DL
HGB BLD-MCNC: 15.2 G/DL (ref 11.7–15.7)
IMM GRANULOCYTES # BLD: 0 10E9/L (ref 0–0.4)
IMM GRANULOCYTES NFR BLD: 0.4 %
LDLC SERPL CALC-MCNC: 214 MG/DL
LYMPHOCYTES # BLD AUTO: 1.8 10E9/L (ref 0.8–5.3)
LYMPHOCYTES NFR BLD AUTO: 35.6 %
MCH RBC QN AUTO: 34.9 PG (ref 26.5–33)
MCHC RBC AUTO-ENTMCNC: 35.4 G/DL (ref 31.5–36.5)
MCV RBC AUTO: 99 FL (ref 78–100)
MONOCYTES # BLD AUTO: 0.5 10E9/L (ref 0–1.3)
MONOCYTES NFR BLD AUTO: 9.5 %
NEUTROPHILS # BLD AUTO: 2.5 10E9/L (ref 1.6–8.3)
NEUTROPHILS NFR BLD AUTO: 51.1 %
NONHDLC SERPL-MCNC: 250 MG/DL
NRBC # BLD AUTO: 0 10*3/UL
NRBC BLD AUTO-RTO: 0 /100
PLATELET # BLD AUTO: 96 10E9/L (ref 150–450)
POTASSIUM SERPL-SCNC: 3.2 MMOL/L (ref 3.4–5.3)
PROT SERPL-MCNC: 7.1 G/DL (ref 6.8–8.8)
RBC # BLD AUTO: 4.35 10E12/L (ref 3.8–5.2)
RETICS # AUTO: 99.6 10E9/L (ref 25–95)
RETICS/RBC NFR AUTO: 2.3 % (ref 0.5–2)
SODIUM SERPL-SCNC: 141 MMOL/L (ref 133–144)
T4 FREE SERPL-MCNC: 0.68 NG/DL (ref 0.76–1.46)
TRIGL SERPL-MCNC: 180 MG/DL
TSH SERPL DL<=0.005 MIU/L-ACNC: 36.53 MU/L (ref 0.4–4)
WBC # BLD AUTO: 4.9 10E9/L (ref 4–11)

## 2020-06-30 PROCEDURE — 80050 GENERAL HEALTH PANEL: CPT | Performed by: NURSE PRACTITIONER

## 2020-06-30 PROCEDURE — 80061 LIPID PANEL: CPT | Performed by: NURSE PRACTITIONER

## 2020-06-30 PROCEDURE — 84439 ASSAY OF FREE THYROXINE: CPT | Performed by: NURSE PRACTITIONER

## 2020-06-30 PROCEDURE — 36415 COLL VENOUS BLD VENIPUNCTURE: CPT | Performed by: NURSE PRACTITIONER

## 2020-06-30 PROCEDURE — 40000847 ZZHCL STATISTIC MORPHOLOGY W/INTERP HISTOLOGY TC 85060: Performed by: NURSE PRACTITIONER

## 2020-06-30 PROCEDURE — 85045 AUTOMATED RETICULOCYTE COUNT: CPT | Performed by: NURSE PRACTITIONER

## 2020-06-30 PROCEDURE — 83036 HEMOGLOBIN GLYCOSYLATED A1C: CPT | Performed by: NURSE PRACTITIONER

## 2020-06-30 RX ORDER — ROSUVASTATIN CALCIUM 40 MG/1
40 TABLET, COATED ORAL DAILY
Qty: 90 TABLET | Refills: 3 | Status: SHIPPED | OUTPATIENT
Start: 2020-06-30 | End: 2021-09-01

## 2020-06-30 RX ORDER — LEVOTHYROXINE SODIUM 100 UG/1
100 TABLET ORAL DAILY
Qty: 90 TABLET | Refills: 0 | Status: SHIPPED | OUTPATIENT
Start: 2020-06-30 | End: 2020-10-09

## 2020-06-30 NOTE — TELEPHONE ENCOUNTER
1. Lipids are extremely high. She is at high risk of a MI/stroke. She is willing to begin Crestor 40 mg. Side effects reviewed. Medication ordered. Will recheck labs lipids/AST/ALT 4 weeks. Message sent to [employer].   2. Her TSH is also very high. This is probably causing her fatigue. She was taking 100 mcg Synthroid. Will restart this and place TSH check for 8 weeks.   3. Potassium is low at 3.2. She should consume more potassium rich foods, such as potatoes, tomatoes, bananas, or orange juice.   4. Fasting glucose is high. Will try an add on A1C. Will notify patient of the results when available and intervene accordingly.   5. AST is high. Needs to stop drinking alcohol completely. Currently drinking at least a 6 pack nighty. Educated about the side effects.   6. PLTs are low. Will add on INR, peripheral smear, and HIV. Will stop asa. She agrees to stop drinking. No recent tick bite. Will see her next week in clinic to further discuss.

## 2020-06-30 NOTE — TELEPHONE ENCOUNTER
Patient scheduled 7/6/20 at 1:30 with Samina Armendariz. LM with appointment date and time. Advised patient to return call to clinic to schedule lab only appointment for Samina in 4 weeks, and one in 8 weeks.

## 2020-07-01 NOTE — PROGRESS NOTES
"Subjective     Kelly Vargas is a 59 year old female who presents to clinic today for the following health issues:    HPI       Thrombocytopenia:  Patient recently had routine labs and her plt count was 96, hgb normal. She was taking a daily 81 mg asa and this was stopped. A peripheral smear was done and the cause of the thrombocytopenia was not apparent. She does drink \"at least a 6 pack of beer\" several times per week, has drank more in the past. AST only slightly elevated, has been higher in the past. She denies abdominal pain. No bloody diarrhea. No weight loss or night sweats. No fevers. No known deer tick bite. No signs of bleeding including petechiae, purpura, or blood in stool. No rashes. Kidney function normal. No known liver disease. She does not take hydrochlorothiazide or any other medications that could cause this. She does have a h/o MS. Some bilateral finger tip numbness. She recently saw her neurologist who ordered bilateral EMGs.  She also notes that she has a h/o RA, but she wonders if that is true. No new joint aches. She does smoke. No interest in quitting.     Due for colonoscopy. Last done in 2005 and it was normal.     Due for mammogram. Will order.     Due for the pneumococcal vaccine. Willing to get.     Patient Active Problem List   Diagnosis     CHD (coronary heart disease)     Dyslipidemia     MS (multiple sclerosis) (H)     Obesity     Tobacco abuse     Hypothyroidism     Depression     Advanced care planning/counseling discussion     Alcohol consumption binge drinking     Ataxia     Atypical squamous cells of undetermined significance on cytologic smear of cervix (ASC-US)     Chronic neck and back pain     Degenerative cervical spinal stenosis     Elevated LFTs     Positive MELVI (antinuclear antibody)     Post-menopausal     Pure hypercholesterolemia     Trigger finger of thumb, unspecified laterality     White matter abnormality on MRI of brain     Hoarseness of voice     Morbid " obesity (H)     Past Surgical History:   Procedure Laterality Date     ANGIOPLASTY  2007    stent to LAD     breast reduction      Bilateral     CARDIAC SURGERY      stent     CHOLECYSTECTOMY       COLONOSCOPY  2005    repeat colonoscopy in 10 years     ENT SURGERY      tonsilectomy     GYN SURGERY      tubal ligation, bilateral     ORTHOPEDIC SURGERY      bilateral carpel tunnel, RT       Social History     Tobacco Use     Smoking status: Current Every Day Smoker     Types: Cigarettes     Last attempt to quit: 8/3/2013     Years since quittin.9     Smokeless tobacco: Never Used     Tobacco comment: tried to quit yes, yr quit    Substance Use Topics     Alcohol use: Yes     Family History   Problem Relation Age of Onset     C.A.D. Mother      Myocardial Infarction Mother         myocardial infarction, cause of death     C.A.D. Father      Myocardial Infarction Father         myocardial infarction, cause of death     Hypertension Brother      Diabetes Sister          Current Outpatient Medications   Medication Sig Dispense Refill     cephALEXin (KEFLEX) 500 MG capsule Take 1 capsule (500 mg) by mouth 2 times daily for 7 days 14 capsule 0     cholecalciferol (VITAMIN D3) 5000 UNITS CAPS capsule Take 1 capsule by mouth daily       Famotidine (PEPCID PO) Take 20 mg by mouth daily as needed for heartburn       glatiramer (COPAXONE) 20 MG/ML injection Inject 1 mL (20 mg) Subcutaneous daily       levothyroxine (SYNTHROID/LEVOTHROID) 100 MCG tablet Take 1 tablet (100 mcg) by mouth daily 90 tablet 0     losartan (COZAAR) 25 MG tablet Take 1 tablet (25 mg) by mouth daily 30 tablet 1     methylphenidate (RITALIN) 10 MG tablet Take 10 mg by mouth as needed        rosuvastatin (CRESTOR) 40 MG tablet Take 1 tablet (40 mg) by mouth daily 90 tablet 3     sertraline (ZOLOFT) 100 MG tablet Take 1 tablet (100 mg) by mouth daily 90 tablet 1     Allergies   Allergen Reactions     Plaquenil [Hydroxychloroquine Sulfate]  Rash       Reviewed and updated as needed this visit by Provider         Review of Systems   As noted in the HPI.       Objective    BP (!) 152/80 (BP Location: Left arm, Patient Position: Chair, Cuff Size: Adult Large)   Pulse 70   Temp 98.6  F (37  C) (Tympanic)   Ht 1.524 m (5')   Wt 81.6 kg (180 lb)   SpO2 96%   BMI 35.15 kg/m    Body mass index is 35.15 kg/m .  Physical Exam   GENERAL: alert, no distress and obese  EYES: Eyes grossly normal to inspection, PERRL and conjunctivae and sclerae normal  HENT: ear canals and TM's normal, nose and mouth without ulcers or lesions  NECK: no adenopathy, no asymmetry, masses, or scars and thyroid normal to palpation  RESP: lungs clear to auscultation - no rales, rhonchi or wheezes  CV: regular rate and rhythm, normal S1 S2, no S3 or S4, no murmur  ABDOMEN: soft, nontender, no organomegaly or masses and obese  NEURO: Normal strength and tone, mentation intact and speech normal  PSYCH: mentation appears normal, affect normal/bright    Diagnostic Test Results:  Patient forgot to go to lab. Will call and place future labs.         Assessment & Plan   (D69.6) Thrombocytopenia (H)  (primary encounter diagnosis)  Plan: Unsure cause. She stopped her asa late last week. Will also add additional labs including HIV Antigen Antibody Combo, Anti Nuclear Ashley gG by IFA with Reflex, Rheumatoid factor, CCP Antibodies ([employer]), and Reflex INR. She did forget to go to lab. Nursing staff will call her to come in for lab only. She also agrees to stop drinking alcohol after this upcoming weekend (has girl's weekend so does not want to stop before then). Will then recheck CBC in 4 weeks. If still high, may send to hematology.     (F10.10) Alcohol consumption binge drinking  Plan: Cessation encouraged.     (Z11.4) Screening for HIV (human immunodeficiency virus)  Plan: HIV Antigen Antibody Combo        Will notify patient of the results when available and intervene accordingly.      (Z11.59) Encounter for hepatitis C screening test for low risk patient  Plan: Hepatitis C Screen Reflex to HCV RNA Quant and         Genotype        Will notify patient of the results when available and intervene accordingly.     (Z12.11) Screening for malignant neoplasm of colon  Plan: GENERAL SURG ADULT REFERRAL        Due for colonoscopy. Will refer to general surgery.     (R79.89) Elevated LFTs  Plan: US Abdomen Limited        Most likely from her alcohol intake. Cessation encouraged. Will also order US. Will notify patient of the results when available and intervene accordingly.     (E66.01) Morbid obesity (H)  Plan: BMI 35, Diet and exercise encouraged.     (Z12.31) Encounter for screening mammogram for breast cancer  Plan: MA Screen Bilateral w/Evans        Will notify patient of the results when available and intervene accordingly.     (I10) Essential hypertension  Plan: BP high today. She has taken losartan (COZAAR) 25 MG in the past. Will restart and recheck BP in 4 weeks. Will also recheck BMP. Will notify patient of the results when available and intervene accordingly.     (Z23) Need for pneumococcal vaccination  Plan: PNEUMOCOCCAL VACCINE,ADULT,SQ OR IM    (Z72.0) Tobacco abuse  Plan: Cessation encouraged.       Samina Armendariz, DEBBI  [facility] [place]

## 2020-07-02 ENCOUNTER — HOSPITAL ENCOUNTER (EMERGENCY)
Facility: HOSPITAL | Age: 60
Discharge: HOME | End: 2020-07-02
Attending: NURSE PRACTITIONER | Admitting: NURSE PRACTITIONER
Payer: COMMERCIAL

## 2020-07-02 ENCOUNTER — TELEPHONE (OUTPATIENT)
Dept: FAMILY MEDICINE | Facility: OTHER | Age: 60
End: 2020-07-02

## 2020-07-02 VITALS
TEMPERATURE: 99 F | WEIGHT: 177 LBS | BODY MASS INDEX: 36.36 KG/M2 | RESPIRATION RATE: 18 BRPM | SYSTOLIC BLOOD PRESSURE: 151 MMHG | DIASTOLIC BLOOD PRESSURE: 85 MMHG | OXYGEN SATURATION: 96 %
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
## 2020-07-02 DIAGNOSIS — S70.362A TICK BITE OF LEFT THIGH WITH INFECTION, INITIAL ENCOUNTER: ICD-10-CM

## 2020-07-02 DIAGNOSIS — L08.9 TICK BITE OF LEFT THIGH WITH INFECTION, INITIAL ENCOUNTER: ICD-10-CM

## 2020-07-02 PROCEDURE — 99213 OFFICE O/P EST LOW 20 MIN: CPT | Mod: Z6 | Performed by: NURSE PRACTITIONER

## 2020-07-02 PROCEDURE — G0463 HOSPITAL OUTPT CLINIC VISIT: HCPCS

## 2020-07-02 RX ORDER — CEPHALEXIN 500 MG/1
500 CAPSULE ORAL 2 TIMES DAILY
Qty: 14 CAPSULE | Refills: 0 | Status: SHIPPED | OUTPATIENT
Start: 2020-07-02 | End: 2020-07-09

## 2020-07-02 NOTE — ED AVS SNAPSHOT
[facility]  [address] [address]  Phone:  [phone]                                    Kelly Vargas   MRN: [id number]    Department:  [facility]   Date of Visit:  7/2/2020           After Visit Summary Signature Page    I have received my discharge instructions, and my questions have been answered. I have discussed any challenges I see with this plan with the nurse or doctor.    ..........................................................................................................................................  Patient/Patient Representative Signature      ..........................................................................................................................................  Patient Representative Print Name and Relationship to Patient    ..................................................               ................................................  Date                                   Time    ..........................................................................................................................................  Reviewed by Signature/Title    ...................................................              ..............................................  Date                                               Time          22EPIC Rev 08/18

## 2020-07-02 NOTE — TELEPHONE ENCOUNTER
Pt wanted me to let you know that she has a bullseye on her left leg from what she thinks was a tick bite.  Pt will be in on Monday for appointment.

## 2020-07-02 NOTE — DISCHARGE INSTRUCTIONS
Complete antibiotic a directed.    Warm compress to area 3-4 times a day, for 10-15 minutes at a time.    Seek immediate medical care with any worsening symptoms.

## 2020-07-02 NOTE — TELEPHONE ENCOUNTER
Per Samina Armendariz, Please let pt know that she should be evaluated in the urgent care today     Left message on cell phone number provided to go to  for evaluation.

## 2020-07-02 NOTE — ED PROVIDER NOTES
History     Chief Complaint   Patient presents with     Insect Bite     HPI  Kelly Vargas is a 59 year old female who presents with complaints of a tick bite to her left upper/inner thigh. 4 days ago she removed a wood tick, stating no concerns of a deer tick. Yesterday she noticed area was getting red, saw something black where she pulled the tick out, and was able to remove the black particle, unsure if it was the tick head or not. Not sure how long tick was attached.   Denies fever, N/V, chills, sweats, myalgias, headaches, vision changes, extremity weakness/paresthesias, or other flu like symptoms.    Home treatment: peroxide    Allergies:  Allergies   Allergen Reactions     Plaquenil [Hydroxychloroquine Sulfate] Rash       Problem List:    Patient Active Problem List    Diagnosis Date Noted     Hoarseness of voice 06/19/2020     Priority: Medium     She has seen ENT. Exam negative.        Trigger finger of thumb, unspecified laterality 03/15/2017     Priority: Medium     Atypical squamous cells of undetermined significance on cytologic smear of cervix (ASC-US) 01/19/2016     Priority: Medium     Overview:   HPV negative, recommend repeat cotesting in 3 years       Elevated LFTs 01/19/2016     Priority: Medium     Post-menopausal 01/13/2016     Priority: Medium     Pure hypercholesterolemia 01/13/2016     Priority: Medium     Alcohol consumption binge drinking 01/10/2016     Priority: Medium     Chronic neck and back pain 01/06/2016     Priority: Medium     Degenerative cervical spinal stenosis 01/06/2016     Priority: Medium     Positive MELVI (antinuclear antibody) 01/06/2016     Priority: Medium     White matter abnormality on MRI of brain 01/06/2016     Priority: Medium     Ataxia 07/17/2015     Priority: Medium     CHD (coronary heart disease) 05/21/2013     Priority: Medium     S/P stenting       Dyslipidemia 05/21/2013     Priority: Medium     MS (multiple sclerosis) (H) 05/21/2013     Priority:  Medium     Obesity 2013     Priority: Medium     Tobacco abuse 2013     Priority: Medium     Hypothyroidism 2013     Priority: Medium     Depression 2013     Priority: Medium     Advanced care planning/counseling discussion 2012     Priority: Medium        Past Medical History:    Past Medical History:   Diagnosis Date     CHD, [place] 2007     Chronic/Recurrent Back Pain 2000     Depression 2011     Dyslipidemia 2000     Hypothyroidism 07/10/2001     Multiple sclerosis (H) 2001     Nephrolithiasis 2012     Obesity 2011     Prediabetes 2012     Rheumatoid arthritis(714.0) 2004     Tobacco Abuse 2011     Vitamin D deficiency 2012       Past Surgical History:    Past Surgical History:   Procedure Laterality Date     ANGIOPLASTY  2007    stent to LAD     breast reduction      Bilateral     CARDIAC SURGERY      stent     CHOLECYSTECTOMY       COLONOSCOPY  2005    repeat colonoscopy in 10 years     ENT SURGERY      tonsilectomy     GYN SURGERY      tubal ligation, bilateral     ORTHOPEDIC SURGERY      bilateral carpel tunnel, RT       Family History:    Family History   Problem Relation Age of Onset     C.A.D. Mother      Myocardial Infarction Mother         myocardial infarction, cause of death     C.A.D. Father      Myocardial Infarction Father         myocardial infarction, cause of death     Hypertension Brother      Diabetes Sister        Social History:  Marital Status:  Legally  [3]  Social History     Tobacco Use     Smoking status: Current Every Day Smoker     Types: Cigarettes     Last attempt to quit: 8/3/2013     Years since quittin.9     Smokeless tobacco: Never Used     Tobacco comment: tried to quit yes, yr quit    Substance Use Topics     Alcohol use: Yes     Drug use: None        Medications:    cephALEXin (KEFLEX) 500 MG capsule  cholecalciferol (VITAMIN D3) 5000 UNITS CAPS  capsule  Famotidine (PEPCID PO)  glatiramer (COPAXONE) 20 MG/ML injection  levothyroxine (SYNTHROID/LEVOTHROID) 100 MCG tablet  methylphenidate (RITALIN) 10 MG tablet  rosuvastatin (CRESTOR) 40 MG tablet  sertraline (ZOLOFT) 100 MG tablet          Review of Systems   Constitutional: Negative.    Eyes: Negative.    Respiratory: Negative.    Cardiovascular: Negative.    Musculoskeletal: Negative for myalgias.   Skin:        Red, swollen, tender rash at area where tick was pulled from left upper/inner thigh   Neurological: Negative.        Physical Exam   BP: 151/85  Heart Rate: 72  Temp: 99  F (37.2  C)  Resp: 18  Weight: 80.3 kg (177 lb)  SpO2: 96 %      Physical Exam  Vitals signs and nursing note reviewed.   Constitutional:       General: She is not in acute distress.  Eyes:      Conjunctiva/sclera: Conjunctivae normal.      Pupils: Pupils are equal, round, and reactive to light.   Neck:      Musculoskeletal: Normal range of motion and neck supple. No neck rigidity.   Cardiovascular:      Rate and Rhythm: Normal rate.   Pulmonary:      Effort: Pulmonary effort is normal.      Breath sounds: Normal breath sounds.   Musculoskeletal:        Legs:       Comments: Area marked is area of concern. It measures 2 cm x 3 cm, erythematous, center firmness, tender to palpation. No fluctuance, open area or drainage. Not consistent with an erythematous migrans.    Neurological:      Mental Status: She is alert and oriented to person, place, and time.   Psychiatric:         Mood and Affect: Mood normal.         Behavior: Behavior normal.           ED Course        Procedures    No results found for this or any previous visit (from the past 24 hour(s)).    Medications - No data to display    Assessments & Plan (with Medical Decision Making)   Assessment:  The patient is a 59 year old female here with concerns regarding redness, swelling, pain at area of where she removed a tick 4 days ago. Sounds like area flared up last night and  she may have removed the tick's head just recently. She is sure it was a wood tick. She is otherwise asymptomatic, afebrile, no new muscle aches, flu like symptoms or new neurological symptoms. Area is consistent with a cellulitis, measuring 2 cm x 3 cm, center firmness, no fluctuance.    Plan:  1) Keflex 500 mg po BID x 7 days. Warm compresses to area 4-5 times daily for 10-15 minutes at a time. Tylenol/ibuprofen as able as needed for pain/swelling. She has follow-up on Monday in clinic and recommend to have area re-evaluated at that time.     Instructed to follow up with PCP in 5-7 days, and to seek medical care sooner with any new or worsening symptoms.    Patient agrees with plan of care and verbalizes understanding.       I have reviewed the nursing notes.  I have reviewed the findings, diagnosis, plan and need for follow up with the patient.  Discharge Medication List as of 7/2/2020  5:29 PM      START taking these medications    Details   cephALEXin (KEFLEX) 500 MG capsule Take 1 capsule (500 mg) by mouth 2 times daily for 7 days, Disp-14 capsule,R-0, E-Prescribe             Final diagnoses:   Tick bite of left thigh with infection, initial encounter - inner/upper       7/2/2020   [facility]     Aisha Hansen APRN CNP  07/06/20 [id number]

## 2020-07-06 ENCOUNTER — OFFICE VISIT (OUTPATIENT)
Dept: FAMILY MEDICINE | Facility: OTHER | Age: 60
End: 2020-07-06
Attending: NURSE PRACTITIONER
Payer: COMMERCIAL

## 2020-07-06 VITALS
OXYGEN SATURATION: 96 % | SYSTOLIC BLOOD PRESSURE: 152 MMHG | WEIGHT: 180 LBS | BODY MASS INDEX: 35.34 KG/M2 | HEART RATE: 70 BPM | TEMPERATURE: 98.6 F | DIASTOLIC BLOOD PRESSURE: 80 MMHG | HEIGHT: 60 IN

## 2020-07-06 DIAGNOSIS — Z12.31 ENCOUNTER FOR SCREENING MAMMOGRAM FOR BREAST CANCER: ICD-10-CM

## 2020-07-06 DIAGNOSIS — I10 ESSENTIAL HYPERTENSION: ICD-10-CM

## 2020-07-06 DIAGNOSIS — F10.10 ALCOHOL CONSUMPTION BINGE DRINKING: ICD-10-CM

## 2020-07-06 DIAGNOSIS — E66.01 MORBID OBESITY (H): ICD-10-CM

## 2020-07-06 DIAGNOSIS — Z11.4 SCREENING FOR HIV (HUMAN IMMUNODEFICIENCY VIRUS): ICD-10-CM

## 2020-07-06 DIAGNOSIS — D69.6 THROMBOCYTOPENIA (H): Primary | ICD-10-CM

## 2020-07-06 DIAGNOSIS — R79.89 ELEVATED LFTS: ICD-10-CM

## 2020-07-06 DIAGNOSIS — Z23 NEED FOR PNEUMOCOCCAL VACCINATION: ICD-10-CM

## 2020-07-06 DIAGNOSIS — Z72.0 TOBACCO ABUSE: ICD-10-CM

## 2020-07-06 DIAGNOSIS — Z11.59 ENCOUNTER FOR HEPATITIS C SCREENING TEST FOR LOW RISK PATIENT: ICD-10-CM

## 2020-07-06 DIAGNOSIS — Z12.11 SCREENING FOR MALIGNANT NEOPLASM OF COLON: ICD-10-CM

## 2020-07-06 PROCEDURE — 90471 IMMUNIZATION ADMIN: CPT | Performed by: NURSE PRACTITIONER

## 2020-07-06 PROCEDURE — 90732 PPSV23 VACC 2 YRS+ SUBQ/IM: CPT | Performed by: NURSE PRACTITIONER

## 2020-07-06 RX ORDER — LOSARTAN POTASSIUM 25 MG/1
25 TABLET ORAL DAILY
Qty: 30 TABLET | Refills: 1 | Status: SHIPPED | OUTPATIENT
Start: 2020-07-06 | End: 2020-09-18

## 2020-07-06 ASSESSMENT — ENCOUNTER SYMPTOMS
RESPIRATORY NEGATIVE: 1
EYES NEGATIVE: 1
CARDIOVASCULAR NEGATIVE: 1
NEUROLOGICAL NEGATIVE: 1
CONSTITUTIONAL NEGATIVE: 1
MYALGIAS: 0

## 2020-07-06 ASSESSMENT — MIFFLIN-ST. JEOR: SCORE: 1307.97

## 2020-07-06 ASSESSMENT — PAIN SCALES - GENERAL: PAINLEVEL: NO PAIN (0)

## 2020-07-06 NOTE — NURSING NOTE
Chief Complaint   Patient presents with     RECHECK     low plt        Initial BP (!) 152/80 (BP Location: Left arm, Patient Position: Chair, Cuff Size: Adult Large)   Pulse 70   Temp 98.6  F (37  C) (Tympanic)   Ht 1.524 m (5')   Wt 81.6 kg (180 lb)   SpO2 96%   BMI 35.15 kg/m   Estimated body mass index is 35.15 kg/m  as calculated from the following:    Height as of this encounter: 1.524 m (5').    Weight as of this encounter: 81.6 kg (180 lb).  Medication Reconciliation: complete  Leticia Galvan LPN

## 2020-07-09 ENCOUNTER — TELEPHONE (OUTPATIENT)
Dept: FAMILY MEDICINE | Facility: OTHER | Age: 60
End: 2020-07-09

## 2020-10-01 ENCOUNTER — TELEPHONE (OUTPATIENT)
Dept: FAMILY MEDICINE | Facility: OTHER | Age: 60
End: 2020-10-01

## 2020-10-01 NOTE — TELEPHONE ENCOUNTER
Referral received for colonoscopy. Called patient.  This patient was  approved by surgery education nurses for meet and greet colonoscopy; however, the patient does not want to schedule at this time and would like to call us back when she is ready to schedule. The patient is aware that a preop H&P may be needed since this will be scheduled out further than 30 days.     Message routed to [facility] requesting to defer colonoscopy referral until patient is ready to schedule .

## 2020-10-27 DIAGNOSIS — E03.9 HYPOTHYROIDISM, UNSPECIFIED TYPE: ICD-10-CM

## 2020-10-30 RX ORDER — LEVOTHYROXINE SODIUM 100 UG/1
100 TABLET ORAL DAILY
Qty: 15 TABLET | Refills: 0 | Status: SHIPPED | OUTPATIENT
Start: 2020-10-30 | End: 2020-12-02

## 2020-10-30 NOTE — TELEPHONE ENCOUNTER
levothyroxine      Last Written Prescription Date:  10/9/20  Last Fill Quantity: 150,   # refills: 0  Last Office Visit: 7/6/20  Future Office visit:       Routing refill request to provider for review/approval because:  Drug not on the [facility], [facility] or [facility] refill protocol or controlled substance

## 2020-12-02 DIAGNOSIS — E03.9 HYPOTHYROIDISM, UNSPECIFIED TYPE: ICD-10-CM

## 2020-12-02 RX ORDER — LEVOTHYROXINE SODIUM 100 UG/1
100 TABLET ORAL DAILY
Qty: 15 TABLET | Refills: 0 | Status: SHIPPED | OUTPATIENT
Start: 2020-12-02 | End: 2021-06-02 | Stop reason: DRUGHIGH

## 2020-12-02 NOTE — TELEPHONE ENCOUNTER
Synthroid  Last Written Prescription Date: 10/30/20  Last Fill Quantity: 15 # of Refills: 0  Last Office Visit: 7/6/20

## 2020-12-06 ENCOUNTER — HEALTH MAINTENANCE LETTER (OUTPATIENT)
Age: 60
End: 2020-12-06

## 2020-12-07 NOTE — TELEPHONE ENCOUNTER
Called and left voicemail again on 12/7/2020 to call scheduling to make appointment for med refills

## 2021-05-25 DIAGNOSIS — F43.23 ADJUSTMENT DISORDER WITH MIXED ANXIETY AND DEPRESSED MOOD: ICD-10-CM

## 2021-05-25 DIAGNOSIS — E03.9 HYPOTHYROIDISM, UNSPECIFIED TYPE: ICD-10-CM

## 2021-05-25 RX ORDER — SERTRALINE HYDROCHLORIDE 100 MG/1
100 TABLET, FILM COATED ORAL DAILY
Qty: 90 TABLET | Refills: 1 | OUTPATIENT
Start: 2021-05-25

## 2021-05-25 RX ORDER — LEVOTHYROXINE SODIUM 100 UG/1
100 TABLET ORAL DAILY
Qty: 15 TABLET | Refills: 0 | OUTPATIENT
Start: 2021-05-25

## 2021-05-25 NOTE — TELEPHONE ENCOUNTER
Call back from patient on follow up appointment with Samina Armendariz for medication review and refills. Patient states she needs to get in as soon as possible.     No open appt's with Samina Armendariz until 6/1/21, notified a message will be sent to PCP's Nurse to coordinate an appointment.    Pt can be reached at [phone]

## 2021-05-25 NOTE — PROGRESS NOTES
Assessment & Plan     Essential hypertension  Well controlled. Continue current medications. Encouraged daily exercise and a low sodium diet. Recommended checking BP's 2x/wk, call the clinic if consistantly s>140 or d>90. Follow up in 6 months.     - Comprehensive metabolic panel (BMP + Alb, Alk Phos, ALT, AST, Total. Bili, TP)  - losartan (COZAAR) 25 MG tablet; Take 1 tablet (25 mg) by mouth daily    Hyperlipidemia, unspecified hyperlipidemia type  Tolerating statin. AST and ALT are slightly elevated, but not above 3 times the upper limit of normal. Will continue Crestor. Liver US also ordered. Will notify patient of the results when available and intervene accordingly.     - Comprehensive metabolic panel (BMP + Alb, Alk Phos, ALT, AST, Total. Bili, TP)  - Lipid Profile    Adjustment disorder with mixed anxiety and depressed mood  Controlled with sertraline. Will continue. F/up 6 months.     Alcohol consumption binge drinking  Elevated LFTs  AST and ALT slightly elevated. AST 64. ALT 59. Still drinking 1-2 glasses of wine 5 times weekly. She was encouraged to quit drinking completely. I have also ordered a liver US in the past, but she never completed this. Will again reorder. Will notify patient of the results when available and intervene accordingly.     - Comprehensive metabolic panel (BMP + Alb, Alk Phos, ALT, AST, Total. Bili, TP)  - Reflex INR    Thrombocytopenia (H)  PLTs 77 today. Was 96 in 6/2020 and was encouraged to come in for a recheck and she never did. CRP and ESR normal. Taking Tylenol, but not ibuprofen. No bloody diarrhea. No fevers to indicate infection. No known tick bite. No weight loss or night sweats. HIV pending. Peripheral smear unremarkable in the past. Will recheck. Was taking asa, but this was stopped in 6/2020. Due for mammogram, colonoscopy, and pap. Stressed the importance of completing these tests. See below. Also hoarse. Saw ENT and was encouraged to follow-up, but never did.   Will also refer to hematology.     - CBC with platelets and differential  - JUST IN CASE    Screening for HIV (human immunodeficiency virus)  - HIV Antigen Antibody Combo  -Will notify patient of the results when available and intervene accordingly.     Encounter for hepatitis C screening test for low risk patient  - Hepatitis C Screen Reflex to HCV RNA Quant and Genotype  -Will notify patient of the results when available and intervene accordingly.     Hypothyroidism, unspecified type  TSH 16.44. Taking Synthroid 100 mcg. Will increase to 125 mcg and recheck Synthroid in 8 weeks. Will notify patient of the results when available and intervene accordingly.       Arthralgia, unspecified joint  She has been told that she has RA in the past. She does not necessarily believe this. Does have some hand pain. Will draw RF, CCP, and MELVI. Will notify patient of the results when available and intervene accordingly.     - Rheumatoid factor  - Anti Nuclear Ashley IgG by IFA with Reflex  - Cyclic Citrullinated Peptide Antibody IgG    Screening for colon cancer  - GENERAL SURG ADULT REFERRAL-Colonoscopy.     Bilateral hand numbness  Recent EMG normal. Most likely r/t her MS. Will follow-up again with Dr. Lira as she is due for an appointment.     Tachycardia  Rarely occurring. Magnesium normal today. Declines Zio Patch. HR today 78. Most likely from a combination of anxiety, smoking, and Ritalin use. Will return if she wants further work-up. She tells me today that she is not concerned.     Hoarseness  Way over due for an ENT appointment. Was suppose to follow up in 2017. Has been referred back several times and she has yet to schedule appointment. Continues to smoke. Stopped the omeprazole. Willing to now see ENT. Another referral was placed.     - OTOLARYNGOLOGY REFERRAL    Wheeze  Oxygen sats 97 percent. Wheezes on exam. CXR clear. Worried about a COPD as she has smoked for many years. Declines PFTs. Will return with new or  worsening symptoms. Low dose chest CT also ordered. Will notify patient of the results when available and intervene accordingly.   - XR Chest 2 Views; Future    Need for vaccination  - SHINGRIX [[id number]]    Hypokalemia  Potassium 3.2. Just slightly low. Encouraged to increase foods higher in potassium. Will recheck at  scheduled on 6/16/21.     Personal h/o Tobacco Abuse  Low dose chest CT ordered. Will notify patient of the results when available and intervene accordingly.     Hyperglycemia:  Glucose 203. A1C ordered. Will notify patient of the results when available and intervene accordingly.       Review of external notes as documented elsewhere in note  Ordering of each unique test  Prescription drug management  60 minutes spent on the date of the encounter doing chart review, review of outside records, review of test results, interpretation of tests, patient visit and documentation        Tobacco Cessation:   reports that she has been smoking cigarettes. She has never used smokeless tobacco.  Tobacco Cessation Action Plan: Information offered: Patient not interested at this time    BMI:   Estimated body mass index is 36.52 kg/m  as calculated from the following:    Height as of this encounter: 1.524 m (5').    Weight as of this encounter: 84.8 kg (187 lb).   Weight management plan: Discussed healthy diet and exercise guidelines    Samina Armendariz NP  [facility] - LILIAN Mendoza is a 60 year old who presents for the following health issues    HPI     Hyperlipidemia Follow-Up      Are you regularly taking any medication or supplement to lower your cholesterol?   Yes- Crestor 40 mg    Are you having muscle aches or other side effects that you think could be caused by your cholesterol lowering medication?  No     Denies chest pain, shortness of breath, dizziness, or syncope, but occasionally will feel her heart race. She notes that this rarely occurs. Does not feel like a  "palpitation, but rather a fast heart rate.     Hypertension Follow-up      Do you check your blood pressure regularly outside of the clinic? No     Are you following a low salt diet? No    Are your blood pressures ever more than 140 on the top number (systolic) OR more   than 90 on the bottom number (diastolic), for example 140/90? No   -Taking losartan 25 mg daily without side effects.   -As noted above, she denies chest pain, shortness of breath, dizziness, or syncope, but occasionally will feel her heart race. She notes that this rarely occurs. Does not feel like a palpitation, but rather a fast heart rate.    -Has 1-2 glasses of wine 5 times per week. Was drinking a lot more nightly.     Depression and Anxiety Follow-Up    How are you doing with your depression since your last visit? Improved     How are you doing with your anxiety since your last visit?  Improved     Are you having other symptoms that might be associated with depression or anxiety? No    Have you had a significant life event? OTHER: divorce finalized this year      Do you have any concerns with your use of alcohol or other drugs? No     Taking sertraline daily without side effects.     No thoughts of suicide.     Hypothyroidism Follow-up      Since last visit, patient describes the following symptoms: Weight stable, no hair loss, no skin changes, no constipation, no loose stools    Taking 100 mcg Synthroid daily.       Thrombocytopenia:  H/O thrombocytopenia, plt were 96 in 6/2020. Hgb normal. Encouraged to return for a recheck, but she never did. She was taking a daily 81 mg asa and this was stopped. A peripheral smear was done in 6/2020 and the cause of the thrombocytopenia was not apparent. She was drinking \"at least a 6 pack of beer\" several times per week, has drank more in the past. Currently drinking 1-2 glasses of wine about 5 times per week. She denies abdominal pain. No nausea or vomiting. No bloody diarrhea. No weight loss or night " sweats. No fevers. No known deer tick bite. No signs of bleeding including petechiae, purpura, or blood in stool. She did have some nose bleeds over the Winter months, but those have resolved. No rashes. Kidney function normal. No known liver disease. AST and ALT have been elevated in the past when she was drinking more alcohol. She does not take hydrochlorothiazide or any other medications that could cause this. She does have a h/o MS. Some bilateral finger tip numbness. She recently saw her neurologist who ordered bilateral EMGs.  These were negative for carpal tunnel. She also notes that she has a h/o RA, but she wonders if that is true. No new joint aches. She does smoke. No interest in quitting. Smoking 1/2 ppd.      Due for a colonoscopy. Last done in 2005 and it was normal. Willing to complete.      Due for the shingles vaccine. Willing to get.     Due for a pap. Will schedule.     She does have MS, follows with Dr. Lira. Last seen in 7/2020. Note was reviewed. Was having numbness and tingling in both hands. EMG was ordered and was negative. Still has numbness in both hands. Also has pain in both hands. Was suppose to see Dr. Lira again, but she canceled. Taking Copaxone and Ritalin. Feels her MS is stable.     Patient is also very hoarse. Saw ENT in 2017. See plan below. Smoking cessation encouraged. Continues to smoke 1/2 pp. Omeprazole was also ordered. She has not been taking this. Uses Pepcid PRN. Was suppose to see ENT 6 weeks later, but never did. Several referrals have been placed since, but she has not returned. Now willing to see ENT. No fevers. No unintentional weight loss.     Plan:     1. Discussed history and exam findings with Nahomy. Marked eric's edema. Smoking cessation is paramount. LPR management with twice daily omeprazole and lifestyle modifications. Discussed risk of progression to atypical and cancerous changes if edema persists. Quit plan information provided. Referral sent for  quit counselor.   At follow up will consider EGD and esophagram to investigate LPR further and be sure esophagus does not have similar changes with chronic acid insult.   2. The risks and benefits of my recommendations, as well as other treatment options were discussed with the patient today. Questions were answered.  3. Return for follow-up in 6 weeks, or sooner, if symptoms worsen.    Kizzy Gonzalez PA-C      Social History     Tobacco Use     Smoking status: Current Every Day Smoker     Types: Cigarettes     Last attempt to quit: 8/3/2013     Years since quittin.8     Smokeless tobacco: Never Used     Tobacco comment: tried to quit yes, yr quit    Substance Use Topics     Alcohol use: Yes     Drug use: None     PHQ 2020   PHQ-9 Total Score 4 0   Q9: Thoughts of better off dead/self-harm past 2 weeks Not at all Not at all     MATT-7 SCORE 2020   Total Score 10 0     Last PHQ-9 2021   1.  Little interest or pleasure in doing things 0   2.  Feeling down, depressed, or hopeless 0   3.  Trouble falling or staying asleep, or sleeping too much 0   4.  Feeling tired or having little energy 0   5.  Poor appetite or overeating 0   6.  Feeling bad about yourself 0   7.  Trouble concentrating 0   8.  Moving slowly or restless 0   Q9: Thoughts of better off dead/self-harm past 2 weeks 0   PHQ-9 Total Score 0   Difficulty at work, home, or with people Not difficult at all     MATT-7  2021   1. Feeling nervous, anxious, or on edge 0   2. Not being able to stop or control worrying 0   3. Worrying too much about different things 0   4. Trouble relaxing 0   5. Being so restless that it is hard to sit still 0   6. Becoming easily annoyed or irritable 0   7. Feeling afraid, as if something awful might happen 0   MATT-7 Total Score 0   If you checked any problems, how difficult have they made it for you to do your work, take care of things at home, or get along with other people? Not  difficult at all     :[id number]}    Review of Systems   As noted in the HPI.       Objective    /78 (BP Location: Left arm, Patient Position: Chair, Cuff Size: Adult Large)   Pulse 78   Temp 98.7  F (37.1  C) (Tympanic)   Ht 1.524 m (5')   Wt 84.8 kg (187 lb)   SpO2 97%   BMI 36.52 kg/m    Body mass index is 36.52 kg/m .  Physical Exam   GENERAL: alert, no distress, obese and hoarse voice  EYES: Eyes grossly normal to inspection, PERRL and conjunctivae and sclerae normal  HENT: ear canals and TM's normal, nose and mouth without ulcers or lesions  NECK: no adenopathy, no asymmetry, masses, or scars and thyroid normal to palpation  RESP: expiratory wheezes throughout on exam  CV: regular rate and rhythm, no murmur, no peripheral edema  ABDOMEN: soft, nontender, no masses and bowel sounds normal  MS: no gross musculoskeletal defects noted, no edema  NEURO: Normal strength and tone, mentation intact and speech normal  PSYCH: mentation appears normal, affect normal/bright    Results for orders placed or performed in visit on 05/27/21 (from the past 24 hour(s))   Comprehensive metabolic panel (BMP + Alb, Alk Phos, ALT, AST, Total. Bili, TP)   Result Value Ref Range    Sodium 140 133 - 144 mmol/L    Potassium 3.2 (L) 3.4 - 5.3 mmol/L    Chloride 107 94 - 109 mmol/L    Carbon Dioxide 27 20 - 32 mmol/L    Anion Gap 6 3 - 14 mmol/L    Glucose 207 (H) 70 - 99 mg/dL    Urea Nitrogen 9 7 - 30 mg/dL    Creatinine 0.48 (L) 0.52 - 1.04 mg/dL    GFR Estimate >90 >60 mL/min/[1.73_m2]    GFR Estimate If Black >90 >60 mL/min/[1.73_m2]    Calcium 8.6 8.5 - 10.1 mg/dL    Bilirubin Total 0.7 0.2 - 1.3 mg/dL    Albumin 3.4 3.4 - 5.0 g/dL    Protein Total 7.3 6.8 - 8.8 g/dL    Alkaline Phosphatase 132 40 - 150 U/L    ALT 59 (H) 0 - 50 U/L    AST 64 (H) 0 - 45 U/L   Lipid Profile   Result Value Ref Range    Cholesterol 203 (H) <200 mg/dL    Triglycerides 163 (H) <150 mg/dL    HDL Cholesterol 61 >49 mg/dL    LDL Cholesterol  Calculated 109 (H) <100 mg/dL    Non HDL Cholesterol 142 (H) <130 mg/dL   CBC with platelets and differential   Result Value Ref Range    WBC 3.7 (L) 4.0 - 11.0 10e9/L    RBC Count 4.27 3.8 - 5.2 10e12/L    Hemoglobin 14.3 11.7 - 15.7 g/dL    Hematocrit 42.2 35.0 - 47.0 %    MCV 99 78 - 100 fl    MCH 33.5 (H) 26.5 - 33.0 pg    MCHC 33.9 31.5 - 36.5 g/dL    RDW 14.6 10.0 - 15.0 %    Platelet Count 77 (L) 150 - 450 10e9/L    Diff Method Automated Method     % Neutrophils 43.2 %    % Lymphocytes 44.5 %    % Monocytes 8.7 %    % Eosinophils 3.0 %    % Basophils 0.3 %    % Immature Granulocytes 0.3 %    Nucleated RBCs 0 0 /100    Absolute Neutrophil 1.6 1.6 - 8.3 10e9/L    Absolute Lymphocytes 1.6 0.8 - 5.3 10e9/L    Absolute Monocytes 0.3 0.0 - 1.3 10e9/L    Absolute Eosinophils 0.1 0.0 - 0.7 10e9/L    Absolute Basophils 0.0 0.0 - 0.2 10e9/L    Abs Immature Granulocytes 0.0 0 - 0.4 10e9/L    Absolute Nucleated RBC 0.0    Reflex INR   Result Value Ref Range    INR 1.12 0.86 - 1.14   TSH with free T4 reflex   Result Value Ref Range    TSH 16.44 (H) 0.40 - 4.00 mU/L   Magnesium   Result Value Ref Range    Magnesium 1.9 1.6 - 2.3 mg/dL     PROCEDURE:  XR CHEST 2 VW     HISTORY: wheezy occasionally, r/o COPD changes; Wheeze, .     COMPARISON:  None.     FINDINGS:  The cardiomediastinal contours are normal.  The trachea is midline.   There is calcific aortic atherosclerosis.  No focal consolidation, effusion or pneumothorax.    No suspicious osseous lesion or subdiaphragmatic free air.                                                                      IMPRESSION:       No focal consolidation. Consider PFTs.     ERNESTO MICHAEL MD        Lung Cancer Screening Shared Decision Making Visit     Kelly Vargas is eligible for lung cancer screening on the basis of the information provided in my signed lung cancer screening order.     I have discussed with patient the risks and benefits of screening for lung cancer with  low-dose CT.     The risks include:  radiation exposure: one low dose chest CT has as much ionizing radiation as about 15 chest x-rays or 6 months of background radiation living in [place]    false positives: 96% of positive findings/nodules are NOT cancer, but some might still require additional diagnostic evaluation, including biopsy  over-diagnosis: some slow growing cancers that might never have been clinically significant will be detected and treated unnecessarily     The benefit of early detection of lung cancer is contingent upon adherence to annual screening or more frequent follow up if indicated.     Furthermore, reaping the benefits of screening requires Kelly A Vargas to be willing and physically able to undergo diagnostic procedures, if indicated. Although no specific guide is available for determining severity of comorbidities, it is reasonable to withhold screening in patients who have greater mortality risk from other diseases.     We did discuss that the only way to prevent lung cancer is to not smoke. Smoking cessation counseling was given, duration < 3 minutes.      I did not offer risk estimation using a calculator such as this one:    ShouldIScreen

## 2021-05-25 NOTE — TELEPHONE ENCOUNTER
levothyroxine      Last Written Prescription Date:  12/02/2020  Last Fill Quantity: 15,   # refills: 0  Last Office Visit: 12/16/2020  Future Office visit:       sertraline      Last Written Prescription Date:  06/19/2020  Last Fill Quantity: 90,   # refills: 1

## 2021-05-25 NOTE — TELEPHONE ENCOUNTER
Left message for patient to return call . She needs an office visit scheduled with krunal before any refills of medications can be given . Please help patient get a future office visit set up.

## 2021-05-26 RX ORDER — SERTRALINE HYDROCHLORIDE 100 MG/1
100 TABLET, FILM COATED ORAL DAILY
Qty: 90 TABLET | Refills: 1 | Status: SHIPPED | OUTPATIENT
Start: 2021-05-26 | End: 2021-09-01

## 2021-05-26 NOTE — TELEPHONE ENCOUNTER
sertraline (ZOLOFT) 100 MG tablet      Last Written Prescription Date:  6/19/20  Last Fill Quantity: 90,   # refills: 1  Last Office Visit: 7/6/20  Future Office visit:    Next 5 appointments (look out 90 days)    May 27, 2021 11:00 AM  (Arrive by 10:45 AM)  Office Visit with Samina Armendariz NP  [facility] [place] ) [address]  [place] [address]  [phone]           Routing refill request to provider for review/approval because:  Due for office visit, has appt scheduled tomorrow. Please advise. Thank you!

## 2021-05-27 ENCOUNTER — OFFICE VISIT (OUTPATIENT)
Dept: FAMILY MEDICINE | Facility: OTHER | Age: 61
End: 2021-05-27
Attending: NURSE PRACTITIONER
Payer: COMMERCIAL

## 2021-05-27 ENCOUNTER — ANCILLARY PROCEDURE (OUTPATIENT)
Dept: GENERAL RADIOLOGY | Facility: OTHER | Age: 61
End: 2021-05-27
Attending: NURSE PRACTITIONER
Payer: COMMERCIAL

## 2021-05-27 VITALS
WEIGHT: 187 LBS | HEIGHT: 60 IN | HEART RATE: 78 BPM | OXYGEN SATURATION: 97 % | TEMPERATURE: 98.7 F | DIASTOLIC BLOOD PRESSURE: 78 MMHG | BODY MASS INDEX: 36.71 KG/M2 | SYSTOLIC BLOOD PRESSURE: 128 MMHG

## 2021-05-27 DIAGNOSIS — D69.6 THROMBOCYTOPENIA (H): ICD-10-CM

## 2021-05-27 DIAGNOSIS — R49.0 HOARSENESS: ICD-10-CM

## 2021-05-27 DIAGNOSIS — F43.23 ADJUSTMENT DISORDER WITH MIXED ANXIETY AND DEPRESSED MOOD: ICD-10-CM

## 2021-05-27 DIAGNOSIS — F10.10 ALCOHOL CONSUMPTION BINGE DRINKING: ICD-10-CM

## 2021-05-27 DIAGNOSIS — Z87.891 PERSONAL HISTORY OF TOBACCO USE: ICD-10-CM

## 2021-05-27 DIAGNOSIS — Z12.11 SCREENING FOR COLON CANCER: ICD-10-CM

## 2021-05-27 DIAGNOSIS — E03.9 HYPOTHYROIDISM, UNSPECIFIED TYPE: ICD-10-CM

## 2021-05-27 DIAGNOSIS — Z11.4 SCREENING FOR HIV (HUMAN IMMUNODEFICIENCY VIRUS): ICD-10-CM

## 2021-05-27 DIAGNOSIS — E87.6 HYPOKALEMIA: ICD-10-CM

## 2021-05-27 DIAGNOSIS — Z23 NEED FOR VACCINATION: ICD-10-CM

## 2021-05-27 DIAGNOSIS — R06.2 WHEEZE: ICD-10-CM

## 2021-05-27 DIAGNOSIS — M25.50 ARTHRALGIA, UNSPECIFIED JOINT: ICD-10-CM

## 2021-05-27 DIAGNOSIS — R73.9 HYPERGLYCEMIA: ICD-10-CM

## 2021-05-27 DIAGNOSIS — R20.0 BILATERAL HAND NUMBNESS: ICD-10-CM

## 2021-05-27 DIAGNOSIS — E78.5 HYPERLIPIDEMIA, UNSPECIFIED HYPERLIPIDEMIA TYPE: ICD-10-CM

## 2021-05-27 DIAGNOSIS — R00.0 TACHYCARDIA: ICD-10-CM

## 2021-05-27 DIAGNOSIS — R79.89 ELEVATED LFTS: ICD-10-CM

## 2021-05-27 DIAGNOSIS — Z11.59 ENCOUNTER FOR HEPATITIS C SCREENING TEST FOR LOW RISK PATIENT: ICD-10-CM

## 2021-05-27 DIAGNOSIS — I10 ESSENTIAL HYPERTENSION: Primary | ICD-10-CM

## 2021-05-27 LAB
ALBUMIN SERPL-MCNC: 3.4 G/DL (ref 3.4–5)
ALP SERPL-CCNC: 132 U/L (ref 40–150)
ALT SERPL W P-5'-P-CCNC: 59 U/L (ref 0–50)
ANION GAP SERPL CALCULATED.3IONS-SCNC: 6 MMOL/L (ref 3–14)
AST SERPL W P-5'-P-CCNC: 64 U/L (ref 0–45)
BASOPHILS # BLD AUTO: 0 10E9/L (ref 0–0.2)
BASOPHILS NFR BLD AUTO: 0.3 %
BILIRUB SERPL-MCNC: 0.7 MG/DL (ref 0.2–1.3)
BUN SERPL-MCNC: 9 MG/DL (ref 7–30)
CALCIUM SERPL-MCNC: 8.6 MG/DL (ref 8.5–10.1)
CHLORIDE SERPL-SCNC: 107 MMOL/L (ref 94–109)
CHOLEST SERPL-MCNC: 203 MG/DL
CO2 SERPL-SCNC: 27 MMOL/L (ref 20–32)
CREAT SERPL-MCNC: 0.48 MG/DL (ref 0.52–1.04)
CRP SERPL-MCNC: 4.2 MG/L (ref 0–8)
DIFFERENTIAL METHOD BLD: ABNORMAL
EOSINOPHIL # BLD AUTO: 0.1 10E9/L (ref 0–0.7)
EOSINOPHIL NFR BLD AUTO: 3 %
ERYTHROCYTE [DISTWIDTH] IN BLOOD BY AUTOMATED COUNT: 14.6 % (ref 10–15)
ERYTHROCYTE [SEDIMENTATION RATE] IN BLOOD BY WESTERGREN METHOD: 12 MM/H (ref 0–30)
EST. AVERAGE GLUCOSE BLD GHB EST-MCNC: 117 MG/DL
GFR SERPL CREATININE-BSD FRML MDRD: >90 ML/MIN/{1.73_M2}
GLUCOSE SERPL-MCNC: 207 MG/DL (ref 70–99)
HBA1C MFR BLD: 5.7 % (ref 0–5.6)
HCT VFR BLD AUTO: 42.2 % (ref 35–47)
HDLC SERPL-MCNC: 61 MG/DL
HGB BLD-MCNC: 14.3 G/DL (ref 11.7–15.7)
IMM GRANULOCYTES # BLD: 0 10E9/L (ref 0–0.4)
IMM GRANULOCYTES NFR BLD: 0.3 %
INR PPP: 1.12 (ref 0.86–1.14)
LDLC SERPL CALC-MCNC: 109 MG/DL
LYMPHOCYTES # BLD AUTO: 1.6 10E9/L (ref 0.8–5.3)
LYMPHOCYTES NFR BLD AUTO: 44.5 %
MAGNESIUM SERPL-MCNC: 1.9 MG/DL (ref 1.6–2.3)
MCH RBC QN AUTO: 33.5 PG (ref 26.5–33)
MCHC RBC AUTO-ENTMCNC: 33.9 G/DL (ref 31.5–36.5)
MCV RBC AUTO: 99 FL (ref 78–100)
MONOCYTES # BLD AUTO: 0.3 10E9/L (ref 0–1.3)
MONOCYTES NFR BLD AUTO: 8.7 %
NEUTROPHILS # BLD AUTO: 1.6 10E9/L (ref 1.6–8.3)
NEUTROPHILS NFR BLD AUTO: 43.2 %
NONHDLC SERPL-MCNC: 142 MG/DL
NRBC # BLD AUTO: 0 10*3/UL
NRBC BLD AUTO-RTO: 0 /100
PLATELET # BLD AUTO: 77 10E9/L (ref 150–450)
POTASSIUM SERPL-SCNC: 3.2 MMOL/L (ref 3.4–5.3)
PROT SERPL-MCNC: 7.3 G/DL (ref 6.8–8.8)
RBC # BLD AUTO: 4.27 10E12/L (ref 3.8–5.2)
RETICS # AUTO: 90.3 10E9/L (ref 25–95)
RETICS/RBC NFR AUTO: 2.1 % (ref 0.5–2)
SODIUM SERPL-SCNC: 140 MMOL/L (ref 133–144)
T4 FREE SERPL-MCNC: 0.63 NG/DL (ref 0.76–1.46)
TRIGL SERPL-MCNC: 163 MG/DL
TSH SERPL DL<=0.005 MIU/L-ACNC: 16.44 MU/L (ref 0.4–4)
WBC # BLD AUTO: 3.7 10E9/L (ref 4–11)

## 2021-05-27 PROCEDURE — 99N1109 PR STATISTIC MORPHOLOGY W/INTERP HISTOLOGY TC 85060: Performed by: NURSE PRACTITIONER

## 2021-05-27 PROCEDURE — 86200 CCP ANTIBODY: CPT | Performed by: NURSE PRACTITIONER

## 2021-05-27 PROCEDURE — 71046 X-RAY EXAM CHEST 2 VIEWS: CPT | Mod: TC | Performed by: RADIOLOGY

## 2021-05-27 PROCEDURE — 84439 ASSAY OF FREE THYROXINE: CPT | Performed by: NURSE PRACTITIONER

## 2021-05-27 PROCEDURE — 86039 ANTINUCLEAR ANTIBODIES (ANA): CPT | Performed by: NURSE PRACTITIONER

## 2021-05-27 PROCEDURE — 85610 PROTHROMBIN TIME: CPT | Performed by: NURSE PRACTITIONER

## 2021-05-27 PROCEDURE — 86803 HEPATITIS C AB TEST: CPT | Performed by: NURSE PRACTITIONER

## 2021-05-27 PROCEDURE — 99215 OFFICE O/P EST HI 40 MIN: CPT | Mod: 25 | Performed by: NURSE PRACTITIONER

## 2021-05-27 PROCEDURE — 85652 RBC SED RATE AUTOMATED: CPT | Performed by: NURSE PRACTITIONER

## 2021-05-27 PROCEDURE — 36415 COLL VENOUS BLD VENIPUNCTURE: CPT | Performed by: NURSE PRACTITIONER

## 2021-05-27 PROCEDURE — 80050 GENERAL HEALTH PANEL: CPT | Performed by: NURSE PRACTITIONER

## 2021-05-27 PROCEDURE — 90471 IMMUNIZATION ADMIN: CPT | Performed by: NURSE PRACTITIONER

## 2021-05-27 PROCEDURE — 90750 HZV VACC RECOMBINANT IM: CPT | Performed by: NURSE PRACTITIONER

## 2021-05-27 PROCEDURE — 99N1182 PR STATISTIC ESTIMATED AVERAGE GLUCOSE: Performed by: NURSE PRACTITIONER

## 2021-05-27 PROCEDURE — 83735 ASSAY OF MAGNESIUM: CPT | Performed by: NURSE PRACTITIONER

## 2021-05-27 PROCEDURE — 86038 ANTINUCLEAR ANTIBODIES: CPT | Performed by: NURSE PRACTITIONER

## 2021-05-27 PROCEDURE — 86431 RHEUMATOID FACTOR QUANT: CPT | Performed by: NURSE PRACTITIONER

## 2021-05-27 PROCEDURE — 80061 LIPID PANEL: CPT | Performed by: NURSE PRACTITIONER

## 2021-05-27 PROCEDURE — G0296 VISIT TO DETERM LDCT ELIG: HCPCS | Performed by: NURSE PRACTITIONER

## 2021-05-27 PROCEDURE — 85045 AUTOMATED RETICULOCYTE COUNT: CPT | Performed by: NURSE PRACTITIONER

## 2021-05-27 PROCEDURE — 86140 C-REACTIVE PROTEIN: CPT | Performed by: NURSE PRACTITIONER

## 2021-05-27 PROCEDURE — 87389 HIV-1 AG W/HIV-1&-2 AB AG IA: CPT | Performed by: NURSE PRACTITIONER

## 2021-05-27 PROCEDURE — 83036 HEMOGLOBIN GLYCOSYLATED A1C: CPT | Performed by: NURSE PRACTITIONER

## 2021-05-27 RX ORDER — GLATIRAMER 40 MG/ML
INJECTION, SOLUTION SUBCUTANEOUS
COMMUNITY
Start: 2021-04-28

## 2021-05-27 RX ORDER — LOSARTAN POTASSIUM 25 MG/1
25 TABLET ORAL DAILY
Qty: 90 TABLET | Refills: 3 | Status: SHIPPED | OUTPATIENT
Start: 2021-05-27

## 2021-05-27 RX ORDER — METHYLPHENIDATE HYDROCHLORIDE 10 MG/1
10 TABLET ORAL PRN
Qty: 30 TABLET | Refills: 0 | Status: CANCELLED | OUTPATIENT
Start: 2021-05-27

## 2021-05-27 RX ORDER — LEVOTHYROXINE SODIUM 125 UG/1
125 TABLET ORAL DAILY
Qty: 90 TABLET | Refills: 0 | Status: SHIPPED | OUTPATIENT
Start: 2021-05-27 | End: 2021-09-01

## 2021-05-27 ASSESSMENT — ANXIETY QUESTIONNAIRES
7. FEELING AFRAID AS IF SOMETHING AWFUL MIGHT HAPPEN: NOT AT ALL
GAD7 TOTAL SCORE: 0
1. FEELING NERVOUS, ANXIOUS, OR ON EDGE: NOT AT ALL
3. WORRYING TOO MUCH ABOUT DIFFERENT THINGS: NOT AT ALL
IF YOU CHECKED OFF ANY PROBLEMS ON THIS QUESTIONNAIRE, HOW DIFFICULT HAVE THESE PROBLEMS MADE IT FOR YOU TO DO YOUR WORK, TAKE CARE OF THINGS AT HOME, OR GET ALONG WITH OTHER PEOPLE: NOT DIFFICULT AT ALL
2. NOT BEING ABLE TO STOP OR CONTROL WORRYING: NOT AT ALL
4. TROUBLE RELAXING: NOT AT ALL
6. BECOMING EASILY ANNOYED OR IRRITABLE: NOT AT ALL
5. BEING SO RESTLESS THAT IT IS HARD TO SIT STILL: NOT AT ALL

## 2021-05-27 ASSESSMENT — MIFFLIN-ST. JEOR: SCORE: 1334.73

## 2021-05-27 ASSESSMENT — PATIENT HEALTH QUESTIONNAIRE - PHQ9: SUM OF ALL RESPONSES TO PHQ QUESTIONS 1-9: 0

## 2021-05-27 ASSESSMENT — PAIN SCALES - GENERAL: PAINLEVEL: WORST PAIN (10)

## 2021-05-27 NOTE — PATIENT INSTRUCTIONS

## 2021-05-27 NOTE — LETTER
My Depression Action Plan  Name: Kelly Vargas   Date of Birth 1960  Date: 5/27/2021    My doctor: Samina Armendariz   My clinic: [facility] [place]  [address]  [place] [address]  [phone]          GREEN    ZONE   Good Control    What it looks like:     Things are going generally well. You have normal ups and downs. You may even feel depressed from time to time, but bad moods usually last less than a day.   What you need to do:  1. Continue to care for yourself (see self care plan)  2. Check your depression survival kit and update it as needed  3. Follow your physician s recommendations including any medication.  4. Do not stop taking medication unless you consult with your physician first.           YELLOW         ZONE Getting Worse    What it looks like:     Depression is starting to interfere with your life.     It may be hard to get out of bed; you may be starting to isolate yourself from others.    Symptoms of depression are starting to last most all day and this has happened for several days.     You may have suicidal thoughts but they are not constant.   What you need to do:     1. Call your care team. Your response to treatment will improve if you keep your care team informed of your progress. Yellow periods are signs an adjustment may need to be made.     2. Continue your self-care.  Just get dressed and ready for the day.  Don't give yourself time to talk yourself out of it.    3. Talk to someone in your support network.    4. Open up your Depression Self-Care Plan/Wellness Kit.           RED    ZONE Medical Alert - Get Help    What it looks like:     Depression is seriously interfering with your life.     You may experience these or other symptoms: You can t get out of bed most days, can t work or engage in other necessary activities, you have trouble taking care of basic hygiene, or basic responsibilities, thoughts of suicide or death that will not go away,  self-injurious behavior.     What you need to do:  1. Call your care team and request a same-day appointment. If they are not available (weekends or after hours) call your local crisis line, emergency room or 911.          Depression Self-Care Plan / Wellness Kit    Many people find that medication and therapy are helpful treatments for managing depression. In addition, making small changes to your everyday life can help to boost your mood and improve your wellbeing. Below are some tips for you to consider. Be sure to talk with your medical provider and/or behavioral health consultant if your symptoms are worsening or not improving.     Sleep   Sleep hygiene  means all of the habits that support good, restful sleep. It includes maintaining a consistent bedtime and wake time, using your bedroom only for sleeping or sex, and keeping the bedroom dark and free of distractions like a computer, smartphone, or television.     Develop a Healthy Routine  Maintain good hygiene. Get out of bed in the morning, make your bed, brush your teeth, take a shower, and get dressed. Don t spend too much time viewing media that makes you feel stressed. Find time to relax each day.    Exercise  Get some form of exercise every day. This will help reduce pain and release endorphins, the  feel good  chemicals in your brain. It can be as simple as just going for a walk or doing some gardening, anything that will get you moving.      Diet  Strive to eat healthy foods, including fruits and vegetables. Drink plenty of water. Avoid excessive sugar, caffeine, alcohol, and other mood-altering substances.     Stay Connected with Others  Stay in touch with friends and family members.    Manage Your Mood  Try deep breathing, massage therapy, biofeedback, or meditation. Take part in fun activities when you can. Try to find something to smile about each day.     Psychotherapy  Be open to working with a therapist if your provider recommends it.      Medication  Be sure to take your medication as prescribed. Most anti-depressants need to be taken every day. It usually takes several weeks for medications to work. Not all medicines work for all people. It is important to follow-up with your provider to make sure you have a treatment plan that is working for you. Do not stop your medication abruptly without first discussing it with your provider.    Crisis Resources   These hotlines are for both adults and children. They and are open 24 hours a day, 7 days a week unless noted otherwise.      National Suicide Prevention Lifeline   7-714-568-TALK (9994)      Crisis Text Line    www.crisistextline.org  Text HOME to 543508 from anywhere in the [place], anytime, about any type of crisis. A live, trained crisis counselor will receive the text and respond quickly.      Jan Lifeline for LGBTQ Youth  A national crisis intervention and suicide lifeline for LGBTQ youth under 25. Provides a safe place to talk without judgement. Call 1-102.134.5193; text START to 268691 or visit www.thetrevorproject.org to talk to a trained counselor.      For [facility] crisis numbers, visit the [facility] website at:  https://[place].gov/dhs/people-we-serve/adults/health-care/mental-health/resources/crisis-contacts.jsp

## 2021-05-27 NOTE — PROGRESS NOTES
Called patient, left voicemail to schedule a lab only appointment to check TSH in 8 weeks, around July 15th

## 2021-05-27 NOTE — NURSING NOTE
Chief Complaint   Patient presents with     Recheck Medication     Hypertension     Lipids     Depression       Initial /78 (BP Location: Left arm, Patient Position: Chair, Cuff Size: Adult Large)   Pulse 78   Temp 98.7  F (37.1  C) (Tympanic)   Ht 1.524 m (5')   Wt 84.8 kg (187 lb)   SpO2 97%   BMI 36.52 kg/m   Estimated body mass index is 36.52 kg/m  as calculated from the following:    Height as of this encounter: 1.524 m (5').    Weight as of this encounter: 84.8 kg (187 lb).  Medication Reconciliation: complete  Leticia Galvan LPN

## 2021-05-28 ASSESSMENT — ANXIETY QUESTIONNAIRES: GAD7 TOTAL SCORE: 0

## 2021-06-01 LAB
ANA PAT SER IF-IMP: ABNORMAL
ANA SER QL IF: POSITIVE
ANA TITR SER IF: ABNORMAL {TITER}
CCP AB SER IA-ACNC: 1 U/ML
COPATH REPORT: NORMAL
HCV AB SERPL QL IA: NONREACTIVE
HIV 1+2 AB+HIV1 P24 AG SERPL QL IA: NONREACTIVE
RHEUMATOID FACT SER NEPH-ACNC: 7 IU/ML (ref 0–20)

## 2021-06-02 DIAGNOSIS — E03.9 HYPOTHYROIDISM, UNSPECIFIED TYPE: Primary | ICD-10-CM

## 2021-06-02 DIAGNOSIS — D69.6 THROMBOCYTOPENIA (H): Primary | ICD-10-CM

## 2021-06-14 NOTE — PROGRESS NOTES
"     SUBJECTIVE:   CC: Kelly Vargas is an 60 year old woman who presents for preventive health visit.       Patient has been advised of split billing requirements and indicates understanding: Yes     Healthy Habits:    Do you get at least three servings of calcium containing foods daily (dairy, green leafy vegetables, etc.)? No, encouraged calcium supplement     Amount of exercise or daily activities, outside of work: 0 day(s) per week; plans to start    Problems taking medications regularly No    Medication side effects: No    Have you had an eye exam in the past two years? yes    Do you see a dentist twice per year? yes    Do you have sleep apnea, excessive snoring or daytime drowsiness?yes, fatigue; feels it is from her MS; declines a sleep study     Hypertension Follow-up      Do you check your blood pressure regularly outside of the clinic? No     Are you following a low salt diet? Yes    Are your blood pressures ever more than 140 on the top number (systolic) OR more   than 90 on the bottom number (diastolic), for example 140/90? No , not checking   -Did not take her losartan today.   -Smoking less than 1 ppd.     H/O thrombocytopenia; has been referred to Dr. Mae. Saw him yesterday, will review note when available. Plt 77 on 5/27/21. She was drinking \"at least a 6 pack of beer\" several times per week. Last drank on Saturday and plans to now avoid all alcohol. Did have 5 beers on Saturday. Does not take asa, but has been taking Aleve daily with her back pain. A peripheral smear was done and the cause of the thrombocytopenia was not apparent. She denies abdominal pain. No bloody diarrhea. No weight loss or night sweats. No fevers. No known deer tick bite. No signs of bleeding including petechiae, purpura, or blood in stool. No rashes. Kidney function normal. No known liver disease. She does not take hydrochlorothiazide or any other medications that could cause this.    Also has a h/o elevated LFTs; " most likely from heavy alcohol intake. Liver US from 6/15/21 showed cirrhosis with ascites. Recent Hep C negative. Was drinking heavily, but notes that after talking with Dr. Mae yesterday, she plans to quit. She was referred to GI yesterday by Dr. Mae. Waiting for an appointment.      She also notes that she threw her back out 3 days ago. Was lifting the box of her side by side and felt something pop in her low back. Pain located in bilateral low back. Does not radiate. No fevers. No numbness or tingling. No bowel or bladder incontinence. She has been taking Aleve daily for the pain. Does feel this helping. Any movement, victorina position changes, increases her pain.     Today's PHQ-2 Score:   PHQ-2 (  [employer]) 2021   Q1: Little interest or pleasure in doing things 0 0   Q2: Feeling down, depressed or hopeless 0 0   PHQ-2 Score 0 0       Abuse: Current or Past(Physical, Sexual or Emotional)- No  Do you feel safe in your environment? Yes    Have you ever done Advance Care Planning? (For example, a Health Directive, POLST, or a discussion with a medical provider or your loved ones about your wishes): has one, will bring copy to the clinic    Social History     Tobacco Use     Smoking status: Current Every Day Smoker     Types: Cigarettes     Last attempt to quit: 8/3/2013     Years since quittin.8     Smokeless tobacco: Never Used     Tobacco comment: tried to quit yes, yr quit    Substance Use Topics     Alcohol use: Yes     If you drink alcohol do you typically have >3 drinks per day or >7 drinks per week? Yes - plans to quit                     Reviewed orders with patient.  Reviewed health maintenance and updated orders accordingly - Yes  BP Readings from Last 3 Encounters:   21 138/84   21 128/78   20 (!) 152/80    Wt Readings from Last 3 Encounters:   21 85.7 kg (189 lb)   21 84.8 kg (187 lb)   20 81.6 kg (180 lb)                  Patient Active  Problem List   Diagnosis     CHD (coronary heart disease)     Dyslipidemia     MS (multiple sclerosis) (H)     Obesity     Tobacco abuse     Hypothyroidism     Depression     Advanced care planning/counseling discussion     Alcohol consumption binge drinking     Ataxia     Atypical squamous cells of undetermined significance on cytologic smear of cervix (ASC-US)     Chronic neck and back pain     Degenerative cervical spinal stenosis     Elevated LFTs     Positive MELVI (antinuclear antibody)     Post-menopausal     Pure hypercholesterolemia     Trigger finger of thumb, unspecified laterality     White matter abnormality on MRI of brain     Hoarseness of voice     Morbid obesity (H)     Past Surgical History:   Procedure Laterality Date     ANGIOPLASTY  2007    stent to LAD     breast reduction      Bilateral     CARDIAC SURGERY      stent     CHOLECYSTECTOMY       COLONOSCOPY  2005    repeat colonoscopy in 10 years     ENT SURGERY      tonsilectomy     GYN SURGERY      tubal ligation, bilateral     ORTHOPEDIC SURGERY      bilateral carpel tunnel, RT       Social History     Tobacco Use     Smoking status: Current Every Day Smoker     Types: Cigarettes     Last attempt to quit: 8/3/2013     Years since quittin.8     Smokeless tobacco: Never Used     Tobacco comment: tried to quit yes, yr quit    Substance Use Topics     Alcohol use: Yes     Family History   Problem Relation Age of Onset     C.A.D. Mother      Myocardial Infarction Mother         myocardial infarction, cause of death     C.A.D. Father      Myocardial Infarction Father         myocardial infarction, cause of death     Hypertension Brother      Diabetes Sister          Current Outpatient Medications   Medication Sig Dispense Refill     cholecalciferol (VITAMIN D3) 5000 UNITS CAPS capsule Take 1 capsule by mouth daily       cyclobenzaprine (FLEXERIL) 5 MG tablet Take 1 tablet (5 mg) by mouth 3 times daily as needed for muscle spasms 30  tablet 0     Famotidine (PEPCID PO) Take 20 mg by mouth daily as needed for heartburn       glatiramer acetate 40 MG/ML injection        levothyroxine (SYNTHROID/LEVOTHROID) 125 MCG tablet Take 1 tablet (125 mcg) by mouth daily 90 tablet 0     losartan (COZAAR) 25 MG tablet Take 1 tablet (25 mg) by mouth daily 90 tablet 3     methylphenidate (RITALIN) 10 MG tablet Take 10 mg by mouth as needed        rosuvastatin (CRESTOR) 40 MG tablet Take 1 tablet (40 mg) by mouth daily 90 tablet 3     sertraline (ZOLOFT) 100 MG tablet Take 1 tablet (100 mg) by mouth daily 90 tablet 1       FSH-7: No flowsheet data found.    Mammogram Screening: Recommended mammography every 1-2 years with patient discussion and risk factor consideration. Pertinent mammograms are reviewed under the imaging tab. Repeat scheduled today.     She has been referred to surgery for a colonoscopy.      History of abnormal Pap smear: NO - age 30-65 PAP every 5 years with negative HPV co-testing recommended  PAP / HPV 8/21/2013   PAP NIL     Reviewed and updated as needed this visit by clinical staff  Tobacco  Allergies  Meds  Problems             Reviewed and updated as needed this visit by Provider  Tobacco   Meds  Problems            Past Medical History:   Diagnosis Date     CHD, [place] 07/11/2007     Chronic/Recurrent Back Pain 09/20/2000     Depression 04/25/2011     Dyslipidemia 06/02/2000     Hypothyroidism 07/10/2001     Multiple sclerosis (H) 04/18/2001    relapsing-remitting     Nephrolithiasis 01/24/2012     Obesity 01/01/2011     Prediabetes 01/12/2012     Rheumatoid arthritis(714.0) 01/06/2004     Tobacco Abuse 01/01/2011     Vitamin D deficiency 08/14/2012      Past Surgical History:   Procedure Laterality Date     ANGIOPLASTY  7/2007    stent to LAD     breast reduction  2005    Bilateral     CARDIAC SURGERY      stent     CHOLECYSTECTOMY       COLONOSCOPY  6/13/2005    repeat colonoscopy in 10 years     ENT SURGERY       "tonsilectomy     GYN SURGERY      tubal ligation, bilateral     ORTHOPEDIC SURGERY      bilateral carpel tunnel, RT       ROS:  CONSTITUTIONAL: NEGATIVE for fever, chills, change in weight  INTEGUMENTARY/SKIN: NEGATIVE for worrisome rashes, moles or lesions  EYES: NEGATIVE for vision changes or irritation  ENT: NEGATIVE for ear, mouth and throat problems  RESP: NEGATIVE for significant cough or SOB  BREAST: NEGATIVE for masses, tenderness or discharge  CV: NEGATIVE for chest pain, palpitations or peripheral edema  GI: NEGATIVE for nausea, abdominal pain, heartburn, or change in bowel habits  : NEGATIVE for unusual urinary or vaginal symptoms. No vaginal bleeding.  MUSCULOSKELETAL: Positive for low back pain; see above  NEURO: NEGATIVE for weakness, dizziness or paresthesias  PSYCHIATRIC: NEGATIVE for changes in mood or affect     OBJECTIVE:   /84 (BP Location: Left arm, Patient Position: Chair, Cuff Size: Adult Regular)   Pulse 67   Temp 99  F (37.2  C) (Tympanic)   Ht 1.491 m (4' 10.7\")   Wt 85.7 kg (189 lb)   SpO2 96%   BMI 38.56 kg/m    EXAM:  GENERAL APPEARANCE: alert, no distress, and obese  EYES: Eyes grossly normal to inspection, PERRL and conjunctivae and sclerae normal  HENT: ear canals and TM's normal, nose and mouth without ulcers or lesions, oropharynx clear and oral mucous membranes moist  NECK: no adenopathy, no asymmetry, masses, or scars and thyroid normal to palpation  RESP: lungs clear to auscultation - no rales, rhonchi or wheezes  BREAST: normal without masses, tenderness or nipple discharge and no palpable axillary masses or adenopathy  CV: regular rate and rhythm, normal S1 S2, no S3 or S4, no murmur, click or rub, no peripheral edema and peripheral pulses strong  ABDOMEN: soft, nontender, no hepatosplenomegaly, no masses and bowel sounds normal   (female): normal female external genitalia, normal urethral meatus, vaginal mucosal atrophy noted, normal cervix, adnexae, and uterus " without masses or abnormal discharge  MS: no musculoskeletal defects are noted and gait is age appropriate without ataxia     Lumbar spine without gross deformity, rash, erythema, or ecchymosis. No point tenderness. Some Paraspinous muscle tenderness bilaterally. Mild pain with extension. Mild pain with flexion. No pain with lateral flexion. No palpable spasm. Negative straight leg raises bilaterally. Patellar reflexes 2+, Achilles 2+ bilateral, LE strength 5/5 bilaterally. Sensation intact to light touch. Posterior tib pulses intact.     SKIN: no suspicious lesions or rashes  NEURO: Normal strength and tone, sensory exam grossly normal, mentation intact and speech normal  PSYCH: mentation appears normal and affect normal/bright    Blood Work pending    PROCEDURES: US ABDOMEN LIMITED     HISTORY: assess liver; elevated LFTs, h/o heavy alcohol use; Alcohol  consumption binge drinking; Elevated LFTs     TECHNIQUE: Grayscale ultrasound of the upper abdomen was performed.     COMPARISON: none      FINDINGS:     MEASUREMENTS:   Liver length:  16 cm.   Common duct diameter:  0.5 cm.     LIVER: There is a coarse echotexture of the liver with superficial  nodularity consistent with cirrhosis. No liver masses are seen. Color  flow Doppler reveals portal vein flow toward the liver. The umbilical  vein appears recanalized suggestive of portal hypertension     GALLBLADDER: The gallbladder is been removed     ABDOMINAL AORTA AND IVC: The abdominal aorta is normally tapering. The  inferior vena cava is patent.     PANCREAS: Pancreas was largely obscured by bowel gas.     Right KIDNEY: The right kidney is free of masses or hydronephrosis.     OTHER: Small amount of ascites is seen.                                                                      IMPRESSION:      Cirrhosis and ascites. Findings suggestive of portal hypertension.     SANDRA GONZALEZ MD    ASSESSMENT/PLAN:   1. Routine general medical examination at a [facility]  care facility  Pap done today. Mammogram scheduled for today. Referral sent for colonoscopy. Dexa-scan at 64. F/up yearly for CP.     2. Screening for cervical cancer  - HPV High Risk Types DNA Cervical  - A pap thin layer screen with  HPV - recommended age 30 - 65 years (select HPV order below)  -Will notify patient of the results when available and intervene accordingly.     3. Thrombocytopenia (H)  Plt stable around 70-75. Following with Dr. Mae. Saw him yesterday. Will review note when completed.     4. Alcohol consumption binge drinking  5. Elevated LFTs  6. Alcoholic cirrhosis of liver with ascites (H)  LFTs continue to be elevated. Liver US with cirrhosis. Will add on some additional labs including an AMA, smooth muscle antibody, MELVI, hep B and Hep A testing, iron panel, and ceruloplasmin for GI. Has already been referred. Waiting for an appointment.     7. Screening for colon cancer  - GENERAL SURG ADULT REFERRAL  Last done over 10 years ago. Will repeat.     8. Acute bilateral low back pain without sciatica  No red flags noted. No fevers. No bowel or bladder incontinence. No numbness or tingling. Seems muscular. Rest, ice, and minimal Tylenol recommended. I do not want her using a lot of Tylenol or Aleve d/t thrombocytopenia and elevated LFTs. Will order Flexeril 5 mg TID PRN. She was made aware of the side effects. Will avoid lifting more than 10 pounds. Will return with new or worsening symptoms.     - cyclobenzaprine (FLEXERIL) 5 MG tablet; Take 1 tablet (5 mg) by mouth 3 times daily as needed for muscle spasms  Dispense: 30 tablet; Refill: 0    Patient has been advised of split billing requirements and indicates understanding: Yes  COUNSELING:   Reviewed preventive health counseling, as reflected in patient instructions       Regular exercise       Healthy diet/nutrition       Vision screening       Hearing screening    Estimated body mass index is 38.56 kg/m  as calculated from the following:     "Height as of this encounter: 1.491 m (4' 10.7\").    Weight as of this encounter: 85.7 kg (189 lb).    Weight management plan: Discussed healthy diet and exercise guidelines    She reports that she has been smoking cigarettes. She has never used smokeless tobacco.  Tobacco Cessation Action Plan:   Information offered: Patient not interested at this time      Counseling Resources:  ATP IV Guidelines  Pooled Cohorts Equation Calculator  Breast Cancer Risk Calculator  BRCA-Related Cancer Risk Assessment: FHS-7 Tool  FRAX Risk Assessment  ICSI Preventive Guidelines  Dietary Guidelines for Americans, 2010  USDA's MyPlate  ASA Prophylaxis  Lung CA Screening    Samina Armendariz NP  [facility] - [place]  "

## 2021-06-15 ENCOUNTER — TRANSFERRED RECORDS (OUTPATIENT)
Dept: HEALTH INFORMATION MANAGEMENT | Facility: CLINIC | Age: 61
End: 2021-06-15

## 2021-06-15 ENCOUNTER — HOSPITAL ENCOUNTER (OUTPATIENT)
Dept: ULTRASOUND IMAGING | Facility: HOSPITAL | Age: 61
End: 2021-06-15
Attending: NURSE PRACTITIONER
Payer: COMMERCIAL

## 2021-06-15 DIAGNOSIS — F10.10 ALCOHOL CONSUMPTION BINGE DRINKING: ICD-10-CM

## 2021-06-15 DIAGNOSIS — R79.89 ELEVATED LFTS: ICD-10-CM

## 2021-06-15 PROCEDURE — 76705 ECHO EXAM OF ABDOMEN: CPT

## 2021-06-16 ENCOUNTER — TELEPHONE (OUTPATIENT)
Dept: MAMMOGRAPHY | Facility: OTHER | Age: 61
End: 2021-06-16

## 2021-06-16 ENCOUNTER — HOSPITAL ENCOUNTER (OUTPATIENT)
Dept: CT IMAGING | Facility: HOSPITAL | Age: 61
End: 2021-06-16
Attending: NURSE PRACTITIONER
Payer: COMMERCIAL

## 2021-06-16 ENCOUNTER — OFFICE VISIT (OUTPATIENT)
Dept: FAMILY MEDICINE | Facility: OTHER | Age: 61
End: 2021-06-16
Attending: NURSE PRACTITIONER
Payer: COMMERCIAL

## 2021-06-16 ENCOUNTER — ANCILLARY PROCEDURE (OUTPATIENT)
Dept: MAMMOGRAPHY | Facility: OTHER | Age: 61
End: 2021-06-16
Attending: NURSE PRACTITIONER
Payer: COMMERCIAL

## 2021-06-16 VITALS
HEIGHT: 59 IN | BODY MASS INDEX: 38.1 KG/M2 | SYSTOLIC BLOOD PRESSURE: 138 MMHG | DIASTOLIC BLOOD PRESSURE: 84 MMHG | TEMPERATURE: 99 F | WEIGHT: 189 LBS | OXYGEN SATURATION: 96 % | HEART RATE: 67 BPM

## 2021-06-16 DIAGNOSIS — R79.89 ELEVATED LFTS: ICD-10-CM

## 2021-06-16 DIAGNOSIS — Z00.00 ROUTINE GENERAL MEDICAL EXAMINATION AT A HEALTH CARE FACILITY: ICD-10-CM

## 2021-06-16 DIAGNOSIS — Z12.4 SCREENING FOR CERVICAL CANCER: ICD-10-CM

## 2021-06-16 DIAGNOSIS — D69.6 THROMBOCYTOPENIA (H): Primary | ICD-10-CM

## 2021-06-16 DIAGNOSIS — K70.31 ALCOHOLIC CIRRHOSIS OF LIVER WITH ASCITES (H): ICD-10-CM

## 2021-06-16 DIAGNOSIS — Z12.11 SCREENING FOR COLON CANCER: ICD-10-CM

## 2021-06-16 DIAGNOSIS — Z12.31 ENCOUNTER FOR SCREENING MAMMOGRAM FOR BREAST CANCER: ICD-10-CM

## 2021-06-16 DIAGNOSIS — F10.10 ALCOHOL CONSUMPTION BINGE DRINKING: ICD-10-CM

## 2021-06-16 DIAGNOSIS — M54.50 ACUTE BILATERAL LOW BACK PAIN WITHOUT SCIATICA: ICD-10-CM

## 2021-06-16 LAB
FERRITIN SERPL-MCNC: 170 NG/ML (ref 8–252)
IRON SATN MFR SERPL: 44 % (ref 15–46)
IRON SERPL-MCNC: 134 UG/DL (ref 35–180)
TIBC SERPL-MCNC: 302 UG/DL (ref 240–430)

## 2021-06-16 PROCEDURE — 99213 OFFICE O/P EST LOW 20 MIN: CPT | Mod: 25 | Performed by: NURSE PRACTITIONER

## 2021-06-16 PROCEDURE — 77063 BREAST TOMOSYNTHESIS BI: CPT | Mod: TC | Performed by: RADIOLOGY

## 2021-06-16 PROCEDURE — 83550 IRON BINDING TEST: CPT | Performed by: NURSE PRACTITIONER

## 2021-06-16 PROCEDURE — 36415 COLL VENOUS BLD VENIPUNCTURE: CPT | Performed by: NURSE PRACTITIONER

## 2021-06-16 PROCEDURE — 86709 HEPATITIS A IGM ANTIBODY: CPT | Performed by: NURSE PRACTITIONER

## 2021-06-16 PROCEDURE — 77067 SCR MAMMO BI INCL CAD: CPT | Mod: TC | Performed by: RADIOLOGY

## 2021-06-16 PROCEDURE — 87340 HEPATITIS B SURFACE AG IA: CPT | Performed by: NURSE PRACTITIONER

## 2021-06-16 PROCEDURE — 83516 IMMUNOASSAY NONANTIBODY: CPT | Performed by: NURSE PRACTITIONER

## 2021-06-16 PROCEDURE — 99396 PREV VISIT EST AGE 40-64: CPT | Performed by: NURSE PRACTITIONER

## 2021-06-16 PROCEDURE — 82104 ALPHA-1-ANTITRYPSIN PHENO: CPT | Mod: 90 | Performed by: NURSE PRACTITIONER

## 2021-06-16 PROCEDURE — 83540 ASSAY OF IRON: CPT | Performed by: NURSE PRACTITIONER

## 2021-06-16 PROCEDURE — 83516 IMMUNOASSAY NONANTIBODY: CPT | Mod: 59 | Performed by: NURSE PRACTITIONER

## 2021-06-16 PROCEDURE — 86038 ANTINUCLEAR ANTIBODIES: CPT | Performed by: NURSE PRACTITIONER

## 2021-06-16 PROCEDURE — 87624 HPV HI-RISK TYP POOLED RSLT: CPT | Performed by: NURSE PRACTITIONER

## 2021-06-16 PROCEDURE — 82728 ASSAY OF FERRITIN: CPT | Performed by: NURSE PRACTITIONER

## 2021-06-16 PROCEDURE — 71271 CT THORAX LUNG CANCER SCR C-: CPT

## 2021-06-16 PROCEDURE — 82390 ASSAY OF CERULOPLASMIN: CPT | Performed by: NURSE PRACTITIONER

## 2021-06-16 PROCEDURE — 82103 ALPHA-1-ANTITRYPSIN TOTAL: CPT | Mod: 90 | Performed by: NURSE PRACTITIONER

## 2021-06-16 PROCEDURE — 86706 HEP B SURFACE ANTIBODY: CPT | Performed by: NURSE PRACTITIONER

## 2021-06-16 PROCEDURE — 86708 HEPATITIS A ANTIBODY: CPT | Performed by: NURSE PRACTITIONER

## 2021-06-16 PROCEDURE — G0123 SCREEN CERV/VAG THIN LAYER: HCPCS | Performed by: NURSE PRACTITIONER

## 2021-06-16 RX ORDER — CYCLOBENZAPRINE HCL 5 MG
5 TABLET ORAL 3 TIMES DAILY PRN
Qty: 30 TABLET | Refills: 0 | Status: SHIPPED | OUTPATIENT
Start: 2021-06-16 | End: 2021-09-01

## 2021-06-16 ASSESSMENT — PAIN SCALES - GENERAL: PAINLEVEL: WORST PAIN (10)

## 2021-06-16 ASSESSMENT — MIFFLIN-ST. JEOR: SCORE: 1323.16

## 2021-06-16 NOTE — NURSING NOTE
"Chief Complaint   Patient presents with     Physical       Initial BP (!) 142/82 (BP Location: Right arm, Patient Position: Chair, Cuff Size: Adult Regular)   Pulse 67   Temp 99  F (37.2  C) (Tympanic)   Ht 1.491 m (4' 10.7\")   Wt 85.7 kg (189 lb)   SpO2 96%   BMI 38.56 kg/m   Estimated body mass index is 38.56 kg/m  as calculated from the following:    Height as of this encounter: 1.491 m (4' 10.7\").    Weight as of this encounter: 85.7 kg (189 lb).  Medication Reconciliation: complete  Leticia Galvan LPN  "

## 2021-06-17 ENCOUNTER — PREP FOR PROCEDURE (OUTPATIENT)
Dept: SURGERY | Facility: OTHER | Age: 61
End: 2021-06-17

## 2021-06-17 ENCOUNTER — TELEPHONE (OUTPATIENT)
Dept: FAMILY MEDICINE | Facility: OTHER | Age: 61
End: 2021-06-17

## 2021-06-17 ENCOUNTER — TELEPHONE (OUTPATIENT)
Dept: SURGERY | Facility: OTHER | Age: 61
End: 2021-06-17

## 2021-06-17 ENCOUNTER — TELEPHONE (OUTPATIENT)
Dept: OTOLARYNGOLOGY | Facility: OTHER | Age: 61
End: 2021-06-17

## 2021-06-17 DIAGNOSIS — Z12.11 SCREENING FOR COLON CANCER: Primary | ICD-10-CM

## 2021-06-17 DIAGNOSIS — Z01.818 PREOP TESTING: Primary | ICD-10-CM

## 2021-06-17 LAB
ANA SER QL IF: NEGATIVE
CERULOPLASMIN SERPL-MCNC: 32 MG/DL (ref 20–60)
COPATH REPORT: NORMAL
HAV IGG SER QL IA: NONREACTIVE
HAV IGM SERPL QL IA: NONREACTIVE
HBV SURFACE AB SERPL IA-ACNC: 0 M[IU]/ML
HBV SURFACE AG SERPL QL IA: NONREACTIVE
MITOCHONDRIA M2 IGG SER-ACNC: 1 U/ML
PAP: NORMAL
TTG IGA SER-ACNC: <1 U/ML
TTG IGG SER-ACNC: <1 U/ML

## 2021-06-17 NOTE — TELEPHONE ENCOUNTER
Referral received for colonoscopy for screening for colon cancer.   This patient was not approved by [facility], surgery education RN for meet and greet colonoscopy without preop appointment.   Patient scheduled for colonoscopy on 7/29/21 with Dr. Quintanilla at [facility] with gatorade bowel prep.   Instructions given via phone and instructions mailed to patient with surgery handbook.   COVID-19 test: 7/25/21  Preop: 7/21/21  Alize Dinh LPN

## 2021-06-17 NOTE — TELEPHONE ENCOUNTER
----- Message from Irma Sweeney MD sent at 6/17/2021  2:33 PM CDT -----  Please call with:  Samina is out of the office today.  Hepatitis A negative  Hepatitis B negative and not immune  Celiac panel negative  Mitochondrial and ceruloplasmin negative   Further recommendation at your visit with GI

## 2021-06-17 NOTE — TELEPHONE ENCOUNTER
[facility] sent a fax stating pt did not return calls to schedule with ENT. Sydnee-HRCoordinator  [phone]

## 2021-06-20 LAB
A1AT PHENOTYP SERPL-IMP: NORMAL
A1AT SERPL-MCNC: 190 MG/DL (ref 90–200)

## 2021-06-21 ENCOUNTER — HOSPITAL ENCOUNTER (OUTPATIENT)
Facility: HOSPITAL | Age: 61
End: 2021-06-21
Attending: SURGERY | Admitting: SURGERY
Payer: COMMERCIAL

## 2021-06-21 DIAGNOSIS — Z12.11 SCREENING FOR COLON CANCER: ICD-10-CM

## 2021-06-21 LAB
FINAL DIAGNOSIS: NORMAL
HPV HR 12 DNA CVX QL NAA+PROBE: NEGATIVE
HPV16 DNA SPEC QL NAA+PROBE: NEGATIVE
HPV18 DNA SPEC QL NAA+PROBE: NEGATIVE
SPECIMEN DESCRIPTION: NORMAL
SPECIMEN SOURCE CVX/VAG CYTO: NORMAL

## 2021-07-13 ENCOUNTER — TELEPHONE (OUTPATIENT)
Dept: SURGERY | Facility: OTHER | Age: 61
End: 2021-07-13

## 2021-07-20 NOTE — PROGRESS NOTES
[facility] [place]  [address]  [facility] [address]  Phone: [phone]  Primary Provider: Sj Ashley  Pre-op Performing Provider: SJ ASHLEY      PREOPERATIVE EVALUATION:  Today's date: 7/21/2021    Kelly Vargas is a 60 year old female who presents for a preoperative evaluation.    Surgical Information:  Surgery/Procedure: Colonoscopy   Surgery Location: [facility]  Surgeon: Dr. Quintanilla  Surgery Date: 7/29/21  Where patient plans to recover: At home with family    Type of Anesthesia Anticipated: to be determined    Assessment & Plan     The proposed surgical procedure is considered LOW risk.    Preop general physical exam  Screen for colon cancer  Plt 78. Talked with anaesthesilogy. Unable to proceed with surgery. No family h/o colon cancer. Will order Cologuard for now.     - CBC with platelets and differential; Future  - Comprehensive metabolic panel (BMP + Alb, Alk Phos, ALT, AST, Total. Bili, TP); Future  - EKG 12-lead complete w/read - (Clinic Performed)  - Comprehensive metabolic panel (BMP + Alb, Alk Phos, ALT, AST, Total. Bili, TP)  - CBC with platelets and differential    MS (multiple sclerosis) (H)  Stable. Continue f/up with neurology.     Dyslipidemia  Tolerating statin. Will continue.     Hypothyroidism, unspecified type  TSH normal.     Current mild episode of major depressive disorder, unspecified whether recurrent (H)  Controlled.     Tobacco abuse  Cessation encouraged.     Thrombocytopenia (H)  Plt 78. Most likely from her liver disease. She has seen hematology. I did talk with anesthesiology who noted that they will not proceed with surgery.     Alcoholic cirrhosis of liver with ascites (H)  Elevated LFTs  LFTs trending down. Drinking less. Will f/up with GI as scheduled.     Alcohol consumption binge drinking  Has been limiting her alcohol, rarely drinking alcohol and when she does it is 3-4 beers    Hyperglycemia:  Will see if lab can add on an A1C. Will  notify patient of the results when available and intervene accordingly.    Thoracic Back Pain   No red flags noted. No fevers. No weakness. Most likely muscular. Ok to take Flexeril as needed. Return with new or worsening symptoms.       RECOMMENDATION:  Plt 78. Talked with anesthesiology. Unable to proceed with surgery. No family h/o colon cancer. Will order Cologuard for now. Will f/up with GI regarding the cirrhosis. Most likely the cause of her low plt.       :[id number]}    Subjective     HPI related to upcoming procedure: Due for screening colonoscopy. Last done in 2005 and it was normal. Recommended repeat in 10 years.       Preop Questions 7/20/2021   1. Have you ever had a heart attack or stroke? No   2. Have you ever had surgery on your heart or blood vessels, such as a stent placement, a coronary artery bypass, or surgery on an artery in your head, neck, heart, or legs? YES - stent, over 10 years ago    3. Do you have chest pain with activity? No   4. Do you have a history of  heart failure? No   5. Do you currently have a cold, bronchitis or symptoms of other infection? No   6. Do you have a cough, shortness of breath, or wheezing? No   7. Do you or anyone in your family have previous history of blood clots? Yes, sister, unsure cause   8. Do you or does anyone in your family have a serious bleeding problem such as prolonged bleeding following surgeries or cuts? No   9. Have you ever had problems with anemia or been told to take iron pills? No   10. Have you had any abnormal blood loss such as black, tarry or bloody stools, or abnormal vaginal bleeding? No   11. Have you ever had a blood transfusion? No   12. Are you willing to have a blood transfusion if it is medically needed before, during, or after your surgery? Yes   13. Have you or any of your relatives ever had problems with anesthesia? No   14. Do you have sleep apnea, excessive snoring or daytime drowsiness? No   15. Do you have any artifical heart  valves or other implanted medical devices like a pacemaker, defibrillator, or continuous glucose monitor? No   16. Do you have artificial joints? No   17. Are you allergic to latex? No        Health Care Directive:  Patient does not have a Health Care Directive or Living Will: has one at home, will bring it in    Preoperative Review of :   reviewed - no record of controlled substances prescribed.      Status of Chronic Conditions:  DEPRESSION - Patient has a long history of Depression of moderate severity requiring medication for control with recent symptoms being stable. Current symptoms of depression include none. Currently taking sertraline 100 mg daily.    HYPERLIPIDEMIA - Patient has a long history of significant Hyperlipidemia requiring medication for treatment with recent fair control. Patient reports no problems or side effects with the medication. Taking Crestor 40 mg without side effects.     HYPERTENSION-taking losartan 25 mg without side effects.     HYPOTHYROIDISM - Patient has a longstanding history of chronic Hypothyroidism. Patient has been doing well, noting no tremor, insomnia, hair loss or changes in skin texture. Continues to take medications as directed, without adverse reactions or side effects. Last TSH   Lab Results   Component Value Date    TSH 16.44 (H) 05/27/2021   .  At this time, her Synthroid was increased from 100 mcg to 125 mcg. Will recheck today.     She does smoke, no interest in quitting.     She has a h/o MS, symptoms currently controlled. She does take methylphenidate 10 mg PRN to help with the fatigue. She is also on glatiramer injections M, W, and F.     She does have a h/o alcohol abuse with cirrhosis of the liver. Recent abdominal US showed cirrhosis and ascites of the liver. She does follow with GI. She also has associated thrombocytopenia, plt were 73 on 6/15/21. She continues to drink once weekly, does continue to have 3-4 beers.     Covid testing scheduled.    She  also complains of some left upper back pain. Occurring for the past 4-5 days. Thinks she lifted her watering can wrong. Pain radiates into left shoulder. No fevers. Pain is constant, but worsens when she moves her left arm.     Review of Systems  CONSTITUTIONAL: NEGATIVE for fever, chills, change in weight  INTEGUMENTARY/SKIN: NEGATIVE for worrisome rashes, moles or lesions  EYES: NEGATIVE for vision changes or irritation  ENT/MOUTH: NEGATIVE for ear, mouth and throat problems  RESP: NEGATIVE for significant cough or SOB  CV: NEGATIVE for chest pain, palpitations or peripheral edema  GI: NEGATIVE for nausea, abdominal pain, heartburn, or change in bowel habits  : NEGATIVE for frequency, dysuria, or hematuria  MUSCULOSKELETAL: NEGATIVE for significant arthralgias or myalgia  NEURO: NEGATIVE for weakness, dizziness or paresthesias  ENDOCRINE: NEGATIVE for temperature intolerance, skin/hair changes  HEME: NEGATIVE for bleeding problems  PSYCHIATRIC: NEGATIVE for changes in mood or affect    Patient Active Problem List    Diagnosis Date Noted     Screening for colon cancer 06/21/2021     Priority: Medium     Added automatically from request for surgery [id number]       Morbid obesity (H) 07/06/2020     Priority: Medium     Hoarseness of voice 06/19/2020     Priority: Medium     She has seen ENT. Exam negative.        Trigger finger of thumb, unspecified laterality 03/15/2017     Priority: Medium     Atypical squamous cells of undetermined significance on cytologic smear of cervix (ASC-US) 01/19/2016     Priority: Medium     Overview:   HPV negative, recommend repeat cotesting in 3 years       Elevated LFTs 01/19/2016     Priority: Medium     Post-menopausal 01/13/2016     Priority: Medium     Pure hypercholesterolemia 01/13/2016     Priority: Medium     Alcohol consumption binge drinking 01/10/2016     Priority: Medium     Chronic neck and back pain 01/06/2016     Priority: Medium     Degenerative cervical spinal  stenosis 01/06/2016     Priority: Medium     Positive MELVI (antinuclear antibody) 01/06/2016     Priority: Medium     White matter abnormality on MRI of brain 01/06/2016     Priority: Medium     Ataxia 07/17/2015     Priority: Medium     CHD (coronary heart disease) 05/21/2013     Priority: Medium     S/P stenting       Dyslipidemia 05/21/2013     Priority: Medium     MS (multiple sclerosis) (H) 05/21/2013     Priority: Medium     Obesity 05/21/2013     Priority: Medium     Tobacco abuse 05/21/2013     Priority: Medium     Hypothyroidism 05/21/2013     Priority: Medium     Depression 05/21/2013     Priority: Medium     Advanced care planning/counseling discussion 06/01/2012     Priority: Medium      Past Medical History:   Diagnosis Date     CHD, Native [place] 07/11/2007     Chronic/Recurrent Back Pain 09/20/2000     Depression 04/25/2011     Dyslipidemia 06/02/2000     Hypothyroidism 07/10/2001     Multiple sclerosis (H) 04/18/2001    relapsing-remitting     Nephrolithiasis 01/24/2012     Obesity 01/01/2011     Prediabetes 01/12/2012     Rheumatoid arthritis(714.0) 01/06/2004     Tobacco Abuse 01/01/2011     Vitamin D deficiency 08/14/2012     Past Surgical History:   Procedure Laterality Date     ANGIOPLASTY  7/2007    stent to LAD     breast reduction  2005    Bilateral     CARDIAC SURGERY      stent     CHOLECYSTECTOMY       COLONOSCOPY  6/13/2005    repeat colonoscopy in 10 years     ENT SURGERY      tonsilectomy     GYN SURGERY      tubal ligation, bilateral     ORTHOPEDIC SURGERY      bilateral carpel tunnel, RT     Current Outpatient Medications   Medication Sig Dispense Refill     cholecalciferol (VITAMIN D3) 5000 UNITS CAPS capsule Take 1 capsule by mouth daily       cyclobenzaprine (FLEXERIL) 5 MG tablet Take 1 tablet (5 mg) by mouth 3 times daily as needed for muscle spasms 30 tablet 0     Famotidine (PEPCID PO) Take 20 mg by mouth daily as needed for heartburn       glatiramer acetate 40 MG/ML injection         levothyroxine (SYNTHROID/LEVOTHROID) 125 MCG tablet Take 1 tablet (125 mcg) by mouth daily 90 tablet 0     losartan (COZAAR) 25 MG tablet Take 1 tablet (25 mg) by mouth daily 90 tablet 3     methylphenidate (RITALIN) 10 MG tablet Take 10 mg by mouth as needed        rosuvastatin (CRESTOR) 40 MG tablet Take 1 tablet (40 mg) by mouth daily 90 tablet 3     sertraline (ZOLOFT) 100 MG tablet Take 1 tablet (100 mg) by mouth daily 90 tablet 1       Allergies   Allergen Reactions     Plaquenil [Hydroxychloroquine Sulfate] Rash        Social History     Tobacco Use     Smoking status: Current Every Day Smoker     Types: Cigarettes     Last attempt to quit: 8/3/2013     Years since quittin.9     Smokeless tobacco: Never Used     Tobacco comment: tried to quit yes, yr quit    Substance Use Topics     Alcohol use: Yes     Family History   Problem Relation Age of Onset     C.A.D. Mother      Myocardial Infarction Mother         myocardial infarction, cause of death     C.A.D. Father      Myocardial Infarction Father         myocardial infarction, cause of death     Hypertension Brother      Diabetes Sister      History   Drug Use Not on file         Objective     /80 (BP Location: Left arm, Patient Position: Chair, Cuff Size: Adult Regular)   Pulse 72   Temp 98.1  F (36.7  C) (Tympanic)   Ht 1.524 m (5')   Wt 81.6 kg (180 lb)   SpO2 98%   BMI 35.15 kg/m      Physical Exam    GENERAL APPEARANCE: healthy, no distress and over weight     EYES: EOMI, PERRL     HENT: ear canals and TM's normal and nose and mouth without ulcers or lesions     NECK: no adenopathy, no asymmetry, masses, or scars and thyroid normal to palpation     RESP: lungs clear to auscultation - no rales, rhonchi or wheezes     CV: regular rates and rhythm, normal S1 S2, no S3 or S4 and no murmur, click or rub     ABDOMEN:  soft, nontender, no HSM or masses and bowel sounds normal     MS: extremities normal- no gross deformities noted, no  evidence of inflammation in joints, FROM in all extremities.     SKIN: no suspicious lesions or rashes     NEURO: Normal strength and tone, sensory exam grossly normal, mentation intact and speech normal     PSYCH: mentation appears normal. and affect normal/bright     LYMPHATICS: No cervical adenopathy    Musculoskeletal: Lumbar and thoracic spine without gross deformity, rash, erythema, or ecchymosis. No point tenderness over her spine. Some Paraspinous muscle tenderness to the left of her thoracic spine. No pain with extension. No pain with flexion. No pain with lateral flexion. No palpable spasm. Negative straight leg raises bilaterally. Patellar reflexes 2+, LE strength 5/5 bilaterally. Sensation intact to light touch. Posterior tib pulses intact. Pain is constant, worse when she moves her left arm.         Recent Labs   Lab Test 05/27/21  1145 06/30/20  0739   HGB 14.3 15.2   PLT 77* 96*   INR 1.12  --     141   POTASSIUM 3.2* 3.2*   CR 0.48* 0.62   A1C 5.7* 5.1        Diagnostics:  Recent Results (from the past 24 hour(s))   Reflex INR    Collection Time: 07/21/21 10:03 AM   Result Value Ref Range    INR 1.14 0.85 - 1.15   TSH with free T4 reflex    Collection Time: 07/21/21 10:03 AM   Result Value Ref Range    TSH 1.07 0.40 - 4.00 mU/L   Comprehensive metabolic panel (BMP + Alb, Alk Phos, ALT, AST, Total. Bili, TP)    Collection Time: 07/21/21 10:03 AM   Result Value Ref Range    Sodium 141 133 - 144 mmol/L    Potassium 3.4 3.4 - 5.3 mmol/L    Chloride 109 94 - 109 mmol/L    Carbon Dioxide (CO2) 26 20 - 32 mmol/L    Anion Gap 6 3 - 14 mmol/L    Urea Nitrogen 15 7 - 30 mg/dL    Creatinine 0.69 0.52 - 1.04 mg/dL    Calcium 9.0 8.5 - 10.1 mg/dL    Glucose 138 (H) 70 - 99 mg/dL    Alkaline Phosphatase 189 (H) 40 - 150 U/L    AST 59 (H) 0 - 45 U/L    ALT 55 (H) 0 - 50 U/L    Protein Total 7.8 6.8 - 8.8 g/dL    Albumin 3.6 3.4 - 5.0 g/dL    Bilirubin Total 1.0 0.2 - 1.3 mg/dL    GFR Estimate >90 >60  mL/min/1.73m2   CBC with platelets and differential    Collection Time: 07/21/21 10:03 AM   Result Value Ref Range    WBC Count 4.4 4.0 - 11.0 10e3/uL    RBC Count 4.25 3.80 - 5.20 10e6/uL    Hemoglobin 14.7 11.7 - 15.7 g/dL    Hematocrit 41.9 35.0 - 47.0 %    MCV 99 78 - 100 fL    MCH 34.6 (H) 26.5 - 33.0 pg    MCHC 35.1 31.5 - 36.5 g/dL    RDW 14.2 10.0 - 15.0 %    Platelet Count 78 (L) 150 - 450 10e3/uL    % Neutrophils 49 %    % Lymphocytes 37 %    % Monocytes 11 %    % Eosinophils 3 %    % Basophils 0 %    % Immature Granulocytes 0 %    NRBCs per 100 WBC 0 <1 /100    Absolute Neutrophils 2.1 1.6 - 8.3 10e3/uL    Absolute Lymphocytes 1.6 0.8 - 5.3 10e3/uL    Absolute Monocytes 0.5 0.0 - 1.3 10e3/uL    Absolute Eosinophils 0.1 0.0 - 0.7 10e3/uL    Absolute Basophils 0.0 0.0 - 0.2 10e3/uL    Absolute Immature Granulocytes 0.0 <=0.0 10e3/uL    Absolute NRBCs 0.0 10e3/uL        EKG: appears normal, NSR, normal axis, normal intervals, no acute ST/T changes c/w ischemia, no LVH by voltage criteria, unchanged from previous tracings, VR 64    Revised Cardiac Risk Index (RCRI):  The patient has the following serious cardiovascular risks for perioperative complications:   - No serious cardiac risks = 0 points     RCRI Interpretation: 0 points: Class I (very low risk - 0.4% complication rate)           Signed Electronically by: Samina Armendariz NP  Copy of this evaluation report is provided to requesting physician.

## 2021-07-20 NOTE — PATIENT INSTRUCTIONS

## 2021-07-21 ENCOUNTER — OFFICE VISIT (OUTPATIENT)
Dept: FAMILY MEDICINE | Facility: OTHER | Age: 61
End: 2021-07-21
Attending: NURSE PRACTITIONER
Payer: COMMERCIAL

## 2021-07-21 ENCOUNTER — TELEPHONE (OUTPATIENT)
Dept: SURGERY | Facility: OTHER | Age: 61
End: 2021-07-21

## 2021-07-21 VITALS
SYSTOLIC BLOOD PRESSURE: 138 MMHG | TEMPERATURE: 98.1 F | OXYGEN SATURATION: 98 % | WEIGHT: 180 LBS | HEIGHT: 60 IN | HEART RATE: 72 BPM | BODY MASS INDEX: 35.34 KG/M2 | DIASTOLIC BLOOD PRESSURE: 80 MMHG

## 2021-07-21 DIAGNOSIS — F32.0 CURRENT MILD EPISODE OF MAJOR DEPRESSIVE DISORDER, UNSPECIFIED WHETHER RECURRENT (H): ICD-10-CM

## 2021-07-21 DIAGNOSIS — Z01.818 PREOP GENERAL PHYSICAL EXAM: Primary | ICD-10-CM

## 2021-07-21 DIAGNOSIS — R73.9 HYPERGLYCEMIA: ICD-10-CM

## 2021-07-21 DIAGNOSIS — E78.5 DYSLIPIDEMIA: ICD-10-CM

## 2021-07-21 DIAGNOSIS — Z72.0 TOBACCO ABUSE: ICD-10-CM

## 2021-07-21 DIAGNOSIS — M54.6 ACUTE LEFT-SIDED THORACIC BACK PAIN: ICD-10-CM

## 2021-07-21 DIAGNOSIS — R79.89 ELEVATED LFTS: ICD-10-CM

## 2021-07-21 DIAGNOSIS — G35 MS (MULTIPLE SCLEROSIS) (H): ICD-10-CM

## 2021-07-21 DIAGNOSIS — D69.6 THROMBOCYTOPENIA (H): ICD-10-CM

## 2021-07-21 DIAGNOSIS — K70.31 ALCOHOLIC CIRRHOSIS OF LIVER WITH ASCITES (H): ICD-10-CM

## 2021-07-21 DIAGNOSIS — E03.9 HYPOTHYROIDISM, UNSPECIFIED TYPE: ICD-10-CM

## 2021-07-21 DIAGNOSIS — Z12.11 SCREEN FOR COLON CANCER: ICD-10-CM

## 2021-07-21 DIAGNOSIS — F10.10 ALCOHOL CONSUMPTION BINGE DRINKING: ICD-10-CM

## 2021-07-21 LAB
ALBUMIN SERPL-MCNC: 3.6 G/DL (ref 3.4–5)
ALP SERPL-CCNC: 189 U/L (ref 40–150)
ALT SERPL W P-5'-P-CCNC: 55 U/L (ref 0–50)
ANION GAP SERPL CALCULATED.3IONS-SCNC: 6 MMOL/L (ref 3–14)
AST SERPL W P-5'-P-CCNC: 59 U/L (ref 0–45)
BASOPHILS # BLD AUTO: 0 10E3/UL (ref 0–0.2)
BASOPHILS NFR BLD AUTO: 0 %
BILIRUB SERPL-MCNC: 1 MG/DL (ref 0.2–1.3)
BUN SERPL-MCNC: 15 MG/DL (ref 7–30)
CALCIUM SERPL-MCNC: 9 MG/DL (ref 8.5–10.1)
CHLORIDE BLD-SCNC: 109 MMOL/L (ref 94–109)
CO2 SERPL-SCNC: 26 MMOL/L (ref 20–32)
CREAT SERPL-MCNC: 0.69 MG/DL (ref 0.52–1.04)
EOSINOPHIL # BLD AUTO: 0.1 10E3/UL (ref 0–0.7)
EOSINOPHIL NFR BLD AUTO: 3 %
ERYTHROCYTE [DISTWIDTH] IN BLOOD BY AUTOMATED COUNT: 14.2 % (ref 10–15)
GFR SERPL CREATININE-BSD FRML MDRD: >90 ML/MIN/1.73M2
GLUCOSE BLD-MCNC: 138 MG/DL (ref 70–99)
HCT VFR BLD AUTO: 41.9 % (ref 35–47)
HGB BLD-MCNC: 14.7 G/DL (ref 11.7–15.7)
IMM GRANULOCYTES # BLD: 0 10E3/UL
IMM GRANULOCYTES NFR BLD: 0 %
INR PPP: 1.14 (ref 0.85–1.15)
LYMPHOCYTES # BLD AUTO: 1.6 10E3/UL (ref 0.8–5.3)
LYMPHOCYTES NFR BLD AUTO: 37 %
MCH RBC QN AUTO: 34.6 PG (ref 26.5–33)
MCHC RBC AUTO-ENTMCNC: 35.1 G/DL (ref 31.5–36.5)
MCV RBC AUTO: 99 FL (ref 78–100)
MONOCYTES # BLD AUTO: 0.5 10E3/UL (ref 0–1.3)
MONOCYTES NFR BLD AUTO: 11 %
NEUTROPHILS # BLD AUTO: 2.1 10E3/UL (ref 1.6–8.3)
NEUTROPHILS NFR BLD AUTO: 49 %
NRBC # BLD AUTO: 0 10E3/UL
NRBC BLD AUTO-RTO: 0 /100
PLATELET # BLD AUTO: 78 10E3/UL (ref 150–450)
POTASSIUM BLD-SCNC: 3.4 MMOL/L (ref 3.4–5.3)
PROT SERPL-MCNC: 7.8 G/DL (ref 6.8–8.8)
RBC # BLD AUTO: 4.25 10E6/UL (ref 3.8–5.2)
SODIUM SERPL-SCNC: 141 MMOL/L (ref 133–144)
TSH SERPL DL<=0.005 MIU/L-ACNC: 1.07 MU/L (ref 0.4–4)
WBC # BLD AUTO: 4.4 10E3/UL (ref 4–11)

## 2021-07-21 PROCEDURE — 80050 GENERAL HEALTH PANEL: CPT | Performed by: NURSE PRACTITIONER

## 2021-07-21 PROCEDURE — 85610 PROTHROMBIN TIME: CPT | Performed by: NURSE PRACTITIONER

## 2021-07-21 PROCEDURE — 93000 ELECTROCARDIOGRAM COMPLETE: CPT | Performed by: INTERNAL MEDICINE

## 2021-07-21 PROCEDURE — 99214 OFFICE O/P EST MOD 30 MIN: CPT | Performed by: NURSE PRACTITIONER

## 2021-07-21 PROCEDURE — 36415 COLL VENOUS BLD VENIPUNCTURE: CPT | Performed by: NURSE PRACTITIONER

## 2021-07-21 ASSESSMENT — MIFFLIN-ST. JEOR: SCORE: 1302.97

## 2021-07-21 ASSESSMENT — PAIN SCALES - GENERAL: PAINLEVEL: WORST PAIN (10)

## 2021-07-21 NOTE — TELEPHONE ENCOUNTER
Samina Armendariz's office called regarding this patient.  Surgery needs to be canceled on 7/29/21 with Dr Quintanilla due to labs.  DERIC MYLES LPN

## 2021-07-21 NOTE — NURSING NOTE
Chief Complaint   Patient presents with     Pre-Op Exam     colonoscopy Dr. Quintanilla 7-        Initial BP (!) 140/84 (BP Location: Left arm, Patient Position: Chair, Cuff Size: Adult Large)   Pulse 72   Temp 98.1  F (36.7  C) (Tympanic)   Ht 1.524 m (5')   Wt 81.6 kg (180 lb)   SpO2 98%   BMI 35.15 kg/m   Estimated body mass index is 35.15 kg/m  as calculated from the following:    Height as of this encounter: 1.524 m (5').    Weight as of this encounter: 81.6 kg (180 lb).  Medication Reconciliation: complete  Leticai Galvan LPN

## 2021-07-28 ENCOUNTER — TELEPHONE (OUTPATIENT)
Dept: FAMILY MEDICINE | Facility: OTHER | Age: 61
End: 2021-07-28

## 2021-07-28 NOTE — TELEPHONE ENCOUNTER
Left message informing patient that per Samina today's office visit for her TSH is not needed as it was addressed at her last visit . If patient has other concerns she would like to address she can certainly keep today's visit otherwise the visit for her thyroid is not needed .

## 2021-08-04 LAB — COLOGUARD-ABSTRACT: POSITIVE

## 2021-08-05 ENCOUNTER — ALLIED HEALTH/NURSE VISIT (OUTPATIENT)
Dept: FAMILY MEDICINE | Facility: OTHER | Age: 61
End: 2021-08-05
Attending: NURSE PRACTITIONER
Payer: COMMERCIAL

## 2021-08-05 DIAGNOSIS — Z23 NEED FOR ZOSTER VACCINATION: Primary | ICD-10-CM

## 2021-08-05 PROCEDURE — 2894A VOIDCORRECT: CPT

## 2021-08-18 ENCOUNTER — TELEPHONE (OUTPATIENT)
Dept: FAMILY MEDICINE | Facility: OTHER | Age: 61
End: 2021-08-18

## 2021-08-18 DIAGNOSIS — Z12.11 SPECIAL SCREENING FOR MALIGNANT NEOPLASMS, COLON: ICD-10-CM

## 2021-08-18 DIAGNOSIS — R19.5 POSITIVE COLORECTAL CANCER SCREENING USING COLOGUARD TEST: Primary | ICD-10-CM

## 2021-08-18 DIAGNOSIS — K70.31 ALCOHOLIC CIRRHOSIS OF LIVER WITH ASCITES (H): ICD-10-CM

## 2021-08-18 DIAGNOSIS — D69.6 THROMBOCYTOPENIA (H): ICD-10-CM

## 2021-08-18 NOTE — TELEPHONE ENCOUNTER
Informed pt of positive cologsergeyrd, will not operate in [place] due to plt of 78, has liver disease.     War referred to GI at [facility] months ago, but she has not scheduled an appointment. She notes that they did call her, but she never made the appointment. Plans to call today.     Will also call to schedule with ENT as way overdue for appointment-hoarseness.

## 2021-08-30 ENCOUNTER — MYC MEDICAL ADVICE (OUTPATIENT)
Dept: FAMILY MEDICINE | Facility: OTHER | Age: 61
End: 2021-08-30

## 2021-08-30 DIAGNOSIS — R10.13 DYSPEPSIA: Primary | ICD-10-CM

## 2021-08-31 ENCOUNTER — TELEPHONE (OUTPATIENT)
Dept: FAMILY MEDICINE | Facility: OTHER | Age: 61
End: 2021-08-31

## 2021-08-31 DIAGNOSIS — R19.5 POSITIVE COLORECTAL CANCER SCREENING USING COLOGUARD TEST: Primary | ICD-10-CM

## 2021-08-31 DIAGNOSIS — M54.50 ACUTE BILATERAL LOW BACK PAIN WITHOUT SCIATICA: ICD-10-CM

## 2021-08-31 DIAGNOSIS — F43.23 ADJUSTMENT DISORDER WITH MIXED ANXIETY AND DEPRESSED MOOD: ICD-10-CM

## 2021-08-31 DIAGNOSIS — E03.9 HYPOTHYROIDISM, UNSPECIFIED TYPE: ICD-10-CM

## 2021-08-31 DIAGNOSIS — D69.6 THROMBOCYTOPENIA (H): ICD-10-CM

## 2021-08-31 DIAGNOSIS — E78.5 DYSLIPIDEMIA: ICD-10-CM

## 2021-08-31 RX ORDER — FAMOTIDINE 20 MG/1
20 TABLET, FILM COATED ORAL
Qty: 90 TABLET | Refills: 1 | Status: SHIPPED | OUTPATIENT
Start: 2021-08-31

## 2021-08-31 NOTE — TELEPHONE ENCOUNTER
----- Message from Jennifer Andrews sent at 8/31/2021  4:30 PM CDT -----  Regarding: Incorrect Order  Hello,     I am reviewing appointment requests and see that you placed a general surgery referral for the above patient. It looks like you would like the patient to have a colonoscopy done.     Can you please enter a Adult Gastro Ref - Procedure Only order?    This will ensure the appropriate team is in contact with the patient.     Thank you, April

## 2021-09-01 RX ORDER — LEVOTHYROXINE SODIUM 125 UG/1
125 TABLET ORAL DAILY
Qty: 90 TABLET | Refills: 0 | Status: SHIPPED | OUTPATIENT
Start: 2021-09-01 | End: 2021-12-28

## 2021-09-01 RX ORDER — CYCLOBENZAPRINE HCL 5 MG
5 TABLET ORAL 3 TIMES DAILY PRN
Qty: 30 TABLET | Refills: 0 | Status: SHIPPED | OUTPATIENT
Start: 2021-09-01 | End: 2023-01-01

## 2021-09-01 RX ORDER — SERTRALINE HYDROCHLORIDE 100 MG/1
100 TABLET, FILM COATED ORAL DAILY
Qty: 90 TABLET | Refills: 1 | Status: SHIPPED | OUTPATIENT
Start: 2021-09-01

## 2021-09-01 RX ORDER — ROSUVASTATIN CALCIUM 40 MG/1
40 TABLET, COATED ORAL DAILY
Qty: 90 TABLET | Refills: 3 | Status: SHIPPED | OUTPATIENT
Start: 2021-09-01

## 2021-09-01 NOTE — TELEPHONE ENCOUNTER
Flexeril       Last Written Prescription Date:  6/16/2021  Last Fill Quantity: 30,   # refills: 0    Synthroid       Last Written Prescription Date:  5/27/2021  Last Fill Quantity: 90,   # refills: 0    Cozaar       Last Written Prescription Date:  5/27/2021  Last Fill Quantity: 90,   # refills: 3    Zoloft       Last Written Prescription Date:  5/26/2021  Last Fill Quantity: 90,   # refills: 1  Last Office Visit: 7/21/2021  Future Office visit:

## 2021-09-25 ENCOUNTER — HEALTH MAINTENANCE LETTER (OUTPATIENT)
Age: 61
End: 2021-09-25

## 2021-10-19 ENCOUNTER — TRANSFERRED RECORDS (OUTPATIENT)
Dept: HEALTH INFORMATION MANAGEMENT | Facility: CLINIC | Age: 61
End: 2021-10-19

## 2021-12-11 ENCOUNTER — PATIENT OUTREACH (OUTPATIENT)
Dept: OTHER | Facility: HOSPITAL | Age: 61
End: 2021-12-11
Payer: COMMERCIAL

## 2021-12-12 NOTE — TELEPHONE ENCOUNTER
Patient Quality Outreach    Patient is due for the following:   Depression  -  PHQ-9 Needed    NEXT STEPS:   Awaiting patient responses to [employer] PHQ/MATT sent today.     Type of outreach:    Sent [employer] message.      Questions for provider review:    None     Laine Sorto RN

## 2021-12-22 ENCOUNTER — TRANSFERRED RECORDS (OUTPATIENT)
Dept: HEALTH INFORMATION MANAGEMENT | Facility: HOSPITAL | Age: 61
End: 2021-12-22
Payer: COMMERCIAL

## 2021-12-28 DIAGNOSIS — E03.9 HYPOTHYROIDISM, UNSPECIFIED TYPE: ICD-10-CM

## 2021-12-28 RX ORDER — LEVOTHYROXINE SODIUM 125 UG/1
125 TABLET ORAL DAILY
Qty: 90 TABLET | Refills: 1 | Status: SHIPPED | OUTPATIENT
Start: 2021-12-28 | End: 2022-12-09

## 2022-02-01 PROBLEM — K70.30 ALCOHOLIC CIRRHOSIS OF LIVER WITHOUT ASCITES (H): Status: ACTIVE | Noted: 2022-02-01

## 2022-02-04 ENCOUNTER — ALLIED HEALTH/NURSE VISIT (OUTPATIENT)
Dept: FAMILY MEDICINE | Facility: OTHER | Age: 62
End: 2022-02-04
Attending: NURSE PRACTITIONER
Payer: COMMERCIAL

## 2022-02-04 DIAGNOSIS — Z23 NEED FOR VACCINATION: Primary | ICD-10-CM

## 2022-02-04 PROCEDURE — 90471 IMMUNIZATION ADMIN: CPT

## 2022-02-04 PROCEDURE — 90750 HZV VACC RECOMBINANT IM: CPT

## 2022-08-27 ENCOUNTER — HEALTH MAINTENANCE LETTER (OUTPATIENT)
Age: 62
End: 2022-08-27

## 2022-12-08 DIAGNOSIS — E03.9 HYPOTHYROIDISM, UNSPECIFIED TYPE: ICD-10-CM

## 2022-12-09 RX ORDER — LEVOTHYROXINE SODIUM 125 UG/1
125 TABLET ORAL DAILY
Qty: 90 TABLET | Refills: 1 | Status: SHIPPED | OUTPATIENT
Start: 2022-12-09

## 2022-12-09 NOTE — TELEPHONE ENCOUNTER
levothyroxine      Last Written Prescription Date:  12/28/21  Last Fill Quantity: 90,   # refills: 1  Last Office Visit: 1/31/22  Future Office visit:       Routing refill request to provider for review/approval because:

## 2023-01-01 ENCOUNTER — TELEPHONE (OUTPATIENT)
Dept: FAMILY MEDICINE | Facility: OTHER | Age: 63
End: 2023-01-01

## 2023-01-01 ENCOUNTER — TRANSFERRED RECORDS (OUTPATIENT)
Dept: HEALTH INFORMATION MANAGEMENT | Facility: CLINIC | Age: 63
End: 2023-01-01

## 2023-01-01 ENCOUNTER — HOSPITAL ENCOUNTER (EMERGENCY)
Facility: HOSPITAL | Age: 63
Discharge: ANOTHER HOSPITAL | End: 2023-12-18
Attending: EMERGENCY MEDICINE | Admitting: EMERGENCY MEDICINE
Payer: MEDICARE

## 2023-01-01 ENCOUNTER — OFFICE VISIT (OUTPATIENT)
Dept: FAMILY MEDICINE | Facility: OTHER | Age: 63
End: 2023-01-01
Attending: NURSE PRACTITIONER
Payer: MEDICARE

## 2023-01-01 ENCOUNTER — HEALTH MAINTENANCE LETTER (OUTPATIENT)
Age: 63
End: 2023-01-01

## 2023-01-01 ENCOUNTER — HOSPITAL ENCOUNTER (OUTPATIENT)
Dept: MRI IMAGING | Facility: HOSPITAL | Age: 63
Discharge: HOME | End: 2023-08-31
Attending: ORTHOPAEDIC SURGERY | Admitting: ORTHOPAEDIC SURGERY
Payer: MEDICARE

## 2023-01-01 ENCOUNTER — MYC MEDICAL ADVICE (OUTPATIENT)
Dept: FAMILY MEDICINE | Facility: OTHER | Age: 63
End: 2023-01-01

## 2023-01-01 ENCOUNTER — MEDICAL CORRESPONDENCE (OUTPATIENT)
Dept: MRI IMAGING | Facility: HOSPITAL | Age: 63
End: 2023-01-01

## 2023-01-01 ENCOUNTER — APPOINTMENT (OUTPATIENT)
Dept: CT IMAGING | Facility: HOSPITAL | Age: 63
End: 2023-01-01
Attending: EMERGENCY MEDICINE
Payer: MEDICARE

## 2023-01-01 ENCOUNTER — APPOINTMENT (OUTPATIENT)
Dept: GENERAL RADIOLOGY | Facility: HOSPITAL | Age: 63
End: 2023-01-01
Attending: NURSE PRACTITIONER
Payer: MEDICARE

## 2023-01-01 ENCOUNTER — LAB (OUTPATIENT)
Dept: LAB | Facility: OTHER | Age: 63
End: 2023-01-01
Payer: MEDICARE

## 2023-01-01 ENCOUNTER — ANCILLARY ORDERS (OUTPATIENT)
Dept: MRI IMAGING | Facility: HOSPITAL | Age: 63
End: 2023-01-01

## 2023-01-01 ENCOUNTER — HOSPITAL ENCOUNTER (EMERGENCY)
Facility: HOSPITAL | Age: 63
Discharge: HOME | End: 2023-08-26
Attending: NURSE PRACTITIONER | Admitting: NURSE PRACTITIONER
Payer: MEDICARE

## 2023-01-01 VITALS
RESPIRATION RATE: 16 BRPM | HEART RATE: 63 BPM | DIASTOLIC BLOOD PRESSURE: 83 MMHG | SYSTOLIC BLOOD PRESSURE: 150 MMHG | TEMPERATURE: 97.8 F | OXYGEN SATURATION: 93 %

## 2023-01-01 VITALS
TEMPERATURE: 98.5 F | RESPIRATION RATE: 22 BRPM | DIASTOLIC BLOOD PRESSURE: 65 MMHG | SYSTOLIC BLOOD PRESSURE: 112 MMHG | BODY MASS INDEX: 32.81 KG/M2 | OXYGEN SATURATION: 94 % | HEART RATE: 72 BPM | WEIGHT: 167.99 LBS

## 2023-01-01 VITALS
BODY MASS INDEX: 34.18 KG/M2 | TEMPERATURE: 97.2 F | DIASTOLIC BLOOD PRESSURE: 80 MMHG | WEIGHT: 175 LBS | OXYGEN SATURATION: 97 % | HEART RATE: 63 BPM | SYSTOLIC BLOOD PRESSURE: 125 MMHG

## 2023-01-01 DIAGNOSIS — M25.561 RIGHT KNEE PAIN: ICD-10-CM

## 2023-01-01 DIAGNOSIS — K70.31 ALCOHOLIC CIRRHOSIS OF LIVER WITH ASCITES (H): ICD-10-CM

## 2023-01-01 DIAGNOSIS — I10 ESSENTIAL HYPERTENSION: ICD-10-CM

## 2023-01-01 DIAGNOSIS — Z72.0 TOBACCO ABUSE: ICD-10-CM

## 2023-01-01 DIAGNOSIS — M25.361 KNEE INSTABILITY, RIGHT: ICD-10-CM

## 2023-01-01 DIAGNOSIS — G35 MS (MULTIPLE SCLEROSIS) (H): ICD-10-CM

## 2023-01-01 DIAGNOSIS — Z12.31 ENCOUNTER FOR SCREENING MAMMOGRAM FOR BREAST CANCER: ICD-10-CM

## 2023-01-01 DIAGNOSIS — Z01.812 PRE-OPERATIVE LABORATORY EXAMINATION: Primary | ICD-10-CM

## 2023-01-01 DIAGNOSIS — Z01.812 PRE-OPERATIVE LABORATORY EXAMINATION: ICD-10-CM

## 2023-01-01 DIAGNOSIS — E03.9 HYPOTHYROIDISM, UNSPECIFIED TYPE: ICD-10-CM

## 2023-01-01 DIAGNOSIS — M25.561 PAIN AND SWELLING OF KNEE, RIGHT: ICD-10-CM

## 2023-01-01 DIAGNOSIS — Z01.818 PREOP GENERAL PHYSICAL EXAM: Primary | ICD-10-CM

## 2023-01-01 DIAGNOSIS — E78.00 PURE HYPERCHOLESTEROLEMIA: ICD-10-CM

## 2023-01-01 DIAGNOSIS — M25.561 RIGHT MEDIAL KNEE PAIN: Primary | ICD-10-CM

## 2023-01-01 DIAGNOSIS — S83.241A ACUTE MEDIAL MENISCUS TEAR, RIGHT, INITIAL ENCOUNTER: ICD-10-CM

## 2023-01-01 DIAGNOSIS — M25.461 PAIN AND SWELLING OF KNEE, RIGHT: ICD-10-CM

## 2023-01-01 DIAGNOSIS — F32.0 CURRENT MILD EPISODE OF MAJOR DEPRESSIVE DISORDER, UNSPECIFIED WHETHER RECURRENT (H): ICD-10-CM

## 2023-01-01 DIAGNOSIS — M25.561 RIGHT KNEE PAIN: Primary | ICD-10-CM

## 2023-01-01 DIAGNOSIS — R10.11 RIGHT UPPER QUADRANT ABDOMINAL PAIN: ICD-10-CM

## 2023-01-01 DIAGNOSIS — K70.30 ALCOHOLIC CIRRHOSIS OF LIVER WITHOUT ASCITES (H): ICD-10-CM

## 2023-01-01 DIAGNOSIS — E66.01 MORBID OBESITY (H): ICD-10-CM

## 2023-01-01 DIAGNOSIS — F10.10 ALCOHOL CONSUMPTION BINGE DRINKING: ICD-10-CM

## 2023-01-01 LAB
ALBUMIN SERPL BCG-MCNC: 3.7 G/DL (ref 3.5–5.2)
ALBUMIN SERPL BCG-MCNC: 3.7 G/DL (ref 3.5–5.2)
ALBUMIN SERPL BCG-MCNC: 3.9 G/DL (ref 3.5–5.2)
ALBUMIN UR-MCNC: 30 MG/DL
ALP SERPL-CCNC: 117 U/L (ref 35–104)
ALP SERPL-CCNC: 121 U/L (ref 35–104)
ALP SERPL-CCNC: 124 U/L (ref 40–150)
ALT SERPL W P-5'-P-CCNC: 31 U/L (ref 0–50)
ALT SERPL W P-5'-P-CCNC: 32 U/L (ref 0–50)
ALT SERPL W P-5'-P-CCNC: 35 U/L (ref 0–50)
ALT SERPL-CCNC: 37 IU/L (ref 6–31)
ANION GAP SERPL CALCULATED.3IONS-SCNC: 10 MMOL/L (ref 7–15)
ANION GAP SERPL CALCULATED.3IONS-SCNC: 11 MMOL/L (ref 7–15)
ANION GAP SERPL CALCULATED.3IONS-SCNC: 13 MMOL/L (ref 7–15)
APPEARANCE UR: ABNORMAL
AST SERPL W P-5'-P-CCNC: 40 U/L (ref 0–45)
AST SERPL W P-5'-P-CCNC: 48 U/L (ref 0–45)
AST SERPL W P-5'-P-CCNC: 54 U/L (ref 0–45)
AST SERPL-CCNC: 61 IU/L (ref 10–40)
ATRIAL RATE - MUSE: 58 BPM
ATRIAL RATE - MUSE: 70 BPM
BACTERIA #/AREA URNS HPF: ABNORMAL /HPF
BASO+EOS+MONOS # BLD AUTO: ABNORMAL 10*3/UL
BASO+EOS+MONOS NFR BLD AUTO: ABNORMAL %
BASOPHILS # BLD AUTO: 0 10E3/UL (ref 0–0.2)
BASOPHILS # BLD AUTO: 0 10E3/UL (ref 0–0.2)
BASOPHILS NFR BLD AUTO: 0 %
BASOPHILS NFR BLD AUTO: 0 %
BILIRUB SERPL-MCNC: 1.2 MG/DL
BILIRUB SERPL-MCNC: 1.4 MG/DL
BILIRUB SERPL-MCNC: 1.5 MG/DL
BILIRUB UR QL STRIP: NEGATIVE
BUN SERPL-MCNC: 11.5 MG/DL (ref 8–23)
BUN SERPL-MCNC: 12.7 MG/DL (ref 8–23)
BUN SERPL-MCNC: 20.5 MG/DL (ref 8–23)
CALCIUM SERPL-MCNC: 9.4 MG/DL (ref 8.8–10.2)
CALCIUM SERPL-MCNC: 9.6 MG/DL (ref 8.8–10.2)
CALCIUM SERPL-MCNC: 9.8 MG/DL (ref 8.8–10.2)
CHLORIDE SERPL-SCNC: 100 MMOL/L (ref 98–107)
CHLORIDE SERPL-SCNC: 104 MMOL/L (ref 98–107)
CHLORIDE SERPL-SCNC: 105 MMOL/L (ref 98–107)
CHOLEST SERPL-MCNC: 227 MG/DL
COLOR UR AUTO: YELLOW
CREAT SERPL-MCNC: 0.58 MG/DL (ref 0.51–0.95)
CREAT SERPL-MCNC: 0.59 MG/DL (ref 0.51–0.95)
CREAT SERPL-MCNC: 0.79 MG/DL (ref 0.51–0.95)
CREATININE (EXTERNAL): 0.69 MG/DL (ref 0.4–1)
DEPRECATED HCO3 PLAS-SCNC: 21 MMOL/L (ref 22–29)
DEPRECATED HCO3 PLAS-SCNC: 23 MMOL/L (ref 22–29)
DEPRECATED HCO3 PLAS-SCNC: 24 MMOL/L (ref 22–29)
DIASTOLIC BLOOD PRESSURE - MUSE: NORMAL MMHG
DIASTOLIC BLOOD PRESSURE - MUSE: NORMAL MMHG
EGFRCR SERPLBLD CKD-EPI 2021: 83 ML/MIN/1.73M2
EGFRCR SERPLBLD CKD-EPI 2021: >90 ML/MIN/1.73M2
EGFRCR SERPLBLD CKD-EPI 2021: >90 ML/MIN/1.73M2
EOSINOPHIL # BLD AUTO: 0 10E3/UL (ref 0–0.7)
EOSINOPHIL # BLD AUTO: 0.1 10E3/UL (ref 0–0.7)
EOSINOPHIL NFR BLD AUTO: 0 %
EOSINOPHIL NFR BLD AUTO: 3 %
ERYTHROCYTE [DISTWIDTH] IN BLOOD BY AUTOMATED COUNT: 13.9 % (ref 10–15)
ERYTHROCYTE [DISTWIDTH] IN BLOOD BY AUTOMATED COUNT: 14.6 % (ref 10–15)
ERYTHROCYTE [DISTWIDTH] IN BLOOD BY AUTOMATED COUNT: 14.6 % (ref 10–15)
EST. AVERAGE GLUCOSE BLD GHB EST-MCNC: 105 MG/DL
GFR ESTIMATED (EXTERNAL): 97 ML/MIN/1.73M2
GLUCOSE (EXTERNAL): 114 MG/DL (ref 70–99)
GLUCOSE SERPL-MCNC: 106 MG/DL (ref 70–99)
GLUCOSE SERPL-MCNC: 112 MG/DL (ref 70–99)
GLUCOSE SERPL-MCNC: 154 MG/DL (ref 70–99)
GLUCOSE UR STRIP-MCNC: NEGATIVE MG/DL
HBA1C MFR BLD: 5.3 %
HCT VFR BLD AUTO: 39.6 % (ref 35–47)
HCT VFR BLD AUTO: 41.4 % (ref 35–47)
HCT VFR BLD AUTO: 41.6 % (ref 35–47)
HDLC SERPL-MCNC: 62 MG/DL
HGB BLD-MCNC: 14 G/DL (ref 11.7–15.7)
HGB BLD-MCNC: 14.1 G/DL (ref 11.7–15.7)
HGB BLD-MCNC: 14.1 G/DL (ref 11.7–15.7)
HGB UR QL STRIP: ABNORMAL
HOLD SPECIMEN: NORMAL
HYALINE CASTS: 7 /LPF
IMM GRANULOCYTES # BLD: 0 10E3/UL
IMM GRANULOCYTES # BLD: 0.1 10E3/UL
IMM GRANULOCYTES NFR BLD: 0 %
IMM GRANULOCYTES NFR BLD: 1 %
INR (EXTERNAL): 1.3 (ref 0.9–1.1)
INR PPP: 1.26 (ref 0.85–1.15)
INR PPP: 1.4 (ref 0.85–1.15)
INTERPRETATION ECG - MUSE: NORMAL
INTERPRETATION ECG - MUSE: NORMAL
KETONES UR STRIP-MCNC: ABNORMAL MG/DL
LDLC SERPL CALC-MCNC: 147 MG/DL
LEUKOCYTE ESTERASE UR QL STRIP: NEGATIVE
LYMPHOCYTES # BLD AUTO: 1.3 10E3/UL (ref 0.8–5.3)
LYMPHOCYTES # BLD AUTO: 1.6 10E3/UL (ref 0.8–5.3)
LYMPHOCYTES NFR BLD AUTO: 13 %
LYMPHOCYTES NFR BLD AUTO: 36 %
MCH RBC QN AUTO: 34.3 PG (ref 26.5–33)
MCH RBC QN AUTO: 35.1 PG (ref 26.5–33)
MCH RBC QN AUTO: 35.9 PG (ref 26.5–33)
MCHC RBC AUTO-ENTMCNC: 33.7 G/DL (ref 31.5–36.5)
MCHC RBC AUTO-ENTMCNC: 34.1 G/DL (ref 31.5–36.5)
MCHC RBC AUTO-ENTMCNC: 35.6 G/DL (ref 31.5–36.5)
MCV RBC AUTO: 101 FL (ref 78–100)
MCV RBC AUTO: 102 FL (ref 78–100)
MCV RBC AUTO: 103 FL (ref 78–100)
MONOCYTES # BLD AUTO: 0.5 10E3/UL (ref 0–1.3)
MONOCYTES # BLD AUTO: 1 10E3/UL (ref 0–1.3)
MONOCYTES NFR BLD AUTO: 10 %
MONOCYTES NFR BLD AUTO: 11 %
MUCOUS THREADS #/AREA URNS LPF: PRESENT /LPF
NEUTROPHILS # BLD AUTO: 2.1 10E3/UL (ref 1.6–8.3)
NEUTROPHILS # BLD AUTO: 7.7 10E3/UL (ref 1.6–8.3)
NEUTROPHILS NFR BLD AUTO: 50 %
NEUTROPHILS NFR BLD AUTO: 76 %
NITRATE UR QL: NEGATIVE
NONHDLC SERPL-MCNC: 165 MG/DL
NRBC # BLD AUTO: 0 10E3/UL
NRBC # BLD AUTO: 0 10E3/UL
NRBC BLD AUTO-RTO: 0 /100
NRBC BLD AUTO-RTO: 0 /100
P AXIS - MUSE: 13 DEGREES
P AXIS - MUSE: 15 DEGREES
PH UR STRIP: 5.5 [PH] (ref 4.7–8)
PLATELET # BLD AUTO: 105 10E3/UL (ref 150–450)
PLATELET # BLD AUTO: 79 10E3/UL (ref 150–450)
PLATELET # BLD AUTO: 81 10E3/UL (ref 150–450)
POTASSIUM (EXTERNAL): 4.1 MEQ/L (ref 3.4–5.1)
POTASSIUM SERPL-SCNC: 3.7 MMOL/L (ref 3.4–5.3)
POTASSIUM SERPL-SCNC: 3.7 MMOL/L (ref 3.4–5.3)
POTASSIUM SERPL-SCNC: 4.3 MMOL/L (ref 3.4–5.3)
PR INTERVAL - MUSE: 158 MS
PR INTERVAL - MUSE: 166 MS
PROT SERPL-MCNC: 6.6 G/DL (ref 6.4–8.3)
PROT SERPL-MCNC: 7 G/DL (ref 6.4–8.3)
PROT SERPL-MCNC: 7.3 G/DL (ref 6.4–8.3)
QRS DURATION - MUSE: 90 MS
QRS DURATION - MUSE: 94 MS
QT - MUSE: 442 MS
QT - MUSE: 452 MS
QTC - MUSE: 443 MS
QTC - MUSE: 477 MS
R AXIS - MUSE: 11 DEGREES
R AXIS - MUSE: 14 DEGREES
RBC # BLD AUTO: 3.93 10E6/UL (ref 3.8–5.2)
RBC # BLD AUTO: 4.02 10E6/UL (ref 3.8–5.2)
RBC # BLD AUTO: 4.08 10E6/UL (ref 3.8–5.2)
RBC URINE: 11 /HPF
SODIUM SERPL-SCNC: 134 MMOL/L (ref 135–145)
SODIUM SERPL-SCNC: 138 MMOL/L (ref 135–145)
SODIUM SERPL-SCNC: 139 MMOL/L (ref 135–145)
SP GR UR STRIP: 1.02 (ref 1–1.03)
SQUAMOUS EPITHELIAL: 7 /HPF
SYSTOLIC BLOOD PRESSURE - MUSE: NORMAL MMHG
SYSTOLIC BLOOD PRESSURE - MUSE: NORMAL MMHG
T AXIS - MUSE: 34 DEGREES
T AXIS - MUSE: 41 DEGREES
TRIGL SERPL-MCNC: 92 MG/DL
TSH SERPL DL<=0.005 MIU/L-ACNC: 3.17 UIU/ML (ref 0.3–4.2)
UROBILINOGEN UR STRIP-MCNC: NORMAL MG/DL
VENTRICULAR RATE- MUSE: 58 BPM
VENTRICULAR RATE- MUSE: 70 BPM
WBC # BLD AUTO: 10.1 10E3/UL (ref 4–11)
WBC # BLD AUTO: 4.4 10E3/UL (ref 4–11)
WBC # BLD AUTO: 4.5 10E3/UL (ref 4–11)
WBC URINE: 2 /HPF

## 2023-01-01 PROCEDURE — 99213 OFFICE O/P EST LOW 20 MIN: CPT | Performed by: NURSE PRACTITIONER

## 2023-01-01 PROCEDURE — 81001 URINALYSIS AUTO W/SCOPE: CPT | Performed by: EMERGENCY MEDICINE

## 2023-01-01 PROCEDURE — G0463 HOSPITAL OUTPT CLINIC VISIT: HCPCS

## 2023-01-01 PROCEDURE — 36415 COLL VENOUS BLD VENIPUNCTURE: CPT | Performed by: EMERGENCY MEDICINE

## 2023-01-01 PROCEDURE — 93005 ELECTROCARDIOGRAM TRACING: CPT

## 2023-01-01 PROCEDURE — 80061 LIPID PANEL: CPT | Mod: ZL | Performed by: NURSE PRACTITIONER

## 2023-01-01 PROCEDURE — 84443 ASSAY THYROID STIM HORMONE: CPT | Mod: ZL | Performed by: NURSE PRACTITIONER

## 2023-01-01 PROCEDURE — 74177 CT ABD & PELVIS W/CONTRAST: CPT | Mod: MG

## 2023-01-01 PROCEDURE — 93005 ELECTROCARDIOGRAM TRACING: CPT | Performed by: NURSE PRACTITIONER

## 2023-01-01 PROCEDURE — 96375 TX/PRO/DX INJ NEW DRUG ADDON: CPT

## 2023-01-01 PROCEDURE — 85025 COMPLETE CBC W/AUTO DIFF WBC: CPT | Performed by: EMERGENCY MEDICINE

## 2023-01-01 PROCEDURE — 93010 ELECTROCARDIOGRAM REPORT: CPT | Performed by: INTERNAL MEDICINE

## 2023-01-01 PROCEDURE — G1010 CDSM STANSON: HCPCS

## 2023-01-01 PROCEDURE — 80053 COMPREHEN METABOLIC PANEL: CPT | Mod: ZL | Performed by: NURSE PRACTITIONER

## 2023-01-01 PROCEDURE — 93010 ELECTROCARDIOGRAM REPORT: CPT | Mod: 77 | Performed by: INTERNAL MEDICINE

## 2023-01-01 PROCEDURE — 80053 COMPREHEN METABOLIC PANEL: CPT | Performed by: EMERGENCY MEDICINE

## 2023-01-01 PROCEDURE — 250N000011 HC RX IP 250 OP 636: Performed by: EMERGENCY MEDICINE

## 2023-01-01 PROCEDURE — 83036 HEMOGLOBIN GLYCOSYLATED A1C: CPT | Mod: ZL | Performed by: NURSE PRACTITIONER

## 2023-01-01 PROCEDURE — 96376 TX/PRO/DX INJ SAME DRUG ADON: CPT

## 2023-01-01 PROCEDURE — 80053 COMPREHEN METABOLIC PANEL: CPT | Mod: ZL

## 2023-01-01 PROCEDURE — 73562 X-RAY EXAM OF KNEE 3: CPT | Mod: RT

## 2023-01-01 PROCEDURE — 85027 COMPLETE CBC AUTOMATED: CPT | Mod: ZL

## 2023-01-01 PROCEDURE — 36415 COLL VENOUS BLD VENIPUNCTURE: CPT | Mod: ZL | Performed by: NURSE PRACTITIONER

## 2023-01-01 PROCEDURE — 85610 PROTHROMBIN TIME: CPT | Performed by: EMERGENCY MEDICINE

## 2023-01-01 PROCEDURE — 36415 COLL VENOUS BLD VENIPUNCTURE: CPT | Mod: ZL

## 2023-01-01 PROCEDURE — 73721 MRI JNT OF LWR EXTRE W/O DYE: CPT | Mod: RT

## 2023-01-01 PROCEDURE — 250N000011 HC RX IP 250 OP 636: Performed by: RADIOLOGY

## 2023-01-01 PROCEDURE — 99285 EMERGENCY DEPT VISIT HI MDM: CPT | Performed by: EMERGENCY MEDICINE

## 2023-01-01 PROCEDURE — G0463 HOSPITAL OUTPT CLINIC VISIT: HCPCS | Mod: 25

## 2023-01-01 PROCEDURE — 99214 OFFICE O/P EST MOD 30 MIN: CPT | Performed by: NURSE PRACTITIONER

## 2023-01-01 PROCEDURE — 85610 PROTHROMBIN TIME: CPT | Mod: ZL | Performed by: NURSE PRACTITIONER

## 2023-01-01 PROCEDURE — 96374 THER/PROPH/DIAG INJ IV PUSH: CPT | Mod: XU

## 2023-01-01 PROCEDURE — 85025 COMPLETE CBC W/AUTO DIFF WBC: CPT | Mod: ZL | Performed by: NURSE PRACTITIONER

## 2023-01-01 PROCEDURE — 99285 EMERGENCY DEPT VISIT HI MDM: CPT | Mod: 25

## 2023-01-01 RX ORDER — FENTANYL CITRATE 50 UG/ML
75 INJECTION, SOLUTION INTRAMUSCULAR; INTRAVENOUS ONCE
Status: COMPLETED | OUTPATIENT
Start: 2023-01-01 | End: 2023-01-01

## 2023-01-01 RX ORDER — FENTANYL CITRATE 50 UG/ML
50 INJECTION, SOLUTION INTRAMUSCULAR; INTRAVENOUS ONCE
Status: COMPLETED | OUTPATIENT
Start: 2023-01-01 | End: 2023-01-01

## 2023-01-01 RX ORDER — HEPARIN SODIUM 10000 [USP'U]/100ML
0-5000 INJECTION, SOLUTION INTRAVENOUS CONTINUOUS
Status: DISCONTINUED | OUTPATIENT
Start: 2023-01-01 | End: 2023-01-01 | Stop reason: HOSPADM

## 2023-01-01 RX ORDER — IOPAMIDOL 755 MG/ML
79 INJECTION, SOLUTION INTRAVASCULAR ONCE
Status: COMPLETED | OUTPATIENT
Start: 2023-01-01 | End: 2023-01-01

## 2023-01-01 RX ADMIN — HEPARIN SODIUM 1350 UNITS/HR: 10000 INJECTION, SOLUTION INTRAVENOUS at 12:51

## 2023-01-01 RX ADMIN — FENTANYL CITRATE 75 MCG: 50 INJECTION INTRAMUSCULAR; INTRAVENOUS at 09:39

## 2023-01-01 RX ADMIN — FENTANYL CITRATE 50 MCG: 50 INJECTION INTRAMUSCULAR; INTRAVENOUS at 11:55

## 2023-01-01 RX ADMIN — IOPAMIDOL 79 ML: 755 INJECTION, SOLUTION INTRAVENOUS at 10:19

## 2023-01-01 RX ADMIN — FENTANYL CITRATE 50 MCG: 50 INJECTION INTRAMUSCULAR; INTRAVENOUS at 13:58

## 2023-01-01 ASSESSMENT — ENCOUNTER SYMPTOMS
CHILLS: 0
JOINT SWELLING: 1
ENDOCRINE NEGATIVE: 1
MYALGIAS: 1
CARDIOVASCULAR NEGATIVE: 1
NAUSEA: 1
VOMITING: 1
EYES NEGATIVE: 1
COLOR CHANGE: 0
MUSCULOSKELETAL NEGATIVE: 1
NEUROLOGICAL NEGATIVE: 1
RESPIRATORY NEGATIVE: 1
FEVER: 0
ABDOMINAL PAIN: 1
CONSTITUTIONAL NEGATIVE: 1

## 2023-01-01 ASSESSMENT — ACTIVITIES OF DAILY LIVING (ADL)
ADLS_ACUITY_SCORE: 35

## 2023-01-01 ASSESSMENT — PAIN SCALES - GENERAL: PAINLEVEL: NO PAIN (0)

## 2023-02-08 NOTE — ED AVS SNAPSHOT
[facility]    [address] [place] [id number]    Phone:  [phone]                                       Kelly Vargas   MRN: [id number]    Department:  [facility]   Date of Visit:  3/6/2017           After Visit Summary Signature Page     I have received my discharge instructions, and my questions have been answered. I have discussed any challenges I see with this plan with the nurse or doctor.    ..........................................................................................................................................  Patient/Patient Representative Signature      ..........................................................................................................................................  Patient Representative Print Name and Relationship to Patient    ..................................................               ................................................  Date                                            Time    ..........................................................................................................................................  Reviewed by Signature/Title    ...................................................              ..............................................  Date                                                            Time           PT BIBA BLS ER BED 12

## 2023-08-26 NOTE — ED PROVIDER NOTES
History     Chief Complaint   Patient presents with    Knee Pain     HPI  Kelly Vargas is a 62 year old female who presents ambulatory with her twin sister for evaluation of right knee pain and swelling that started 2 weeks ago.  She denies any recent trauma or injury.  She tells me that when she is walking her knee will occasionally give out on her.  Pain is mostly to the medial aspect of her knee.  No history of surgeries or significant injuries to her knee.  She did have pain to this knee a couple years ago which did not require any further interventions.  Pain resolved on its own at that time.  She has been applying ice packs to her knee and taking Tylenol for pain.  Pain does radiate down to her ankle.    Allergies:  Allergies   Allergen Reactions    Plaquenil [Hydroxychloroquine Sulfate] Rash       Problem List:    Patient Active Problem List    Diagnosis Date Noted    Alcoholic cirrhosis of liver without ascites (H)-secondary to alcohol abuse 02/01/2022     Priority: Medium    Screening for colon cancer 06/21/2021     Priority: Medium     Added automatically from request for surgery [id number]      Morbid obesity (H) 07/06/2020     Priority: Medium    Hoarseness of voice 06/19/2020     Priority: Medium     She has seen ENT. Exam negative.       Trigger finger of thumb, unspecified laterality 03/15/2017     Priority: Medium    Atypical squamous cells of undetermined significance on cytologic smear of cervix (ASC-US) 01/19/2016     Priority: Medium     Overview:   HPV negative, recommend repeat cotesting in 3 years      Elevated LFTs 01/19/2016     Priority: Medium    Post-menopausal 01/13/2016     Priority: Medium    Pure hypercholesterolemia 01/13/2016     Priority: Medium    Alcohol consumption binge drinking 01/10/2016     Priority: Medium    Chronic neck and back pain 01/06/2016     Priority: Medium    Degenerative cervical spinal stenosis 01/06/2016     Priority: Medium    Positive MELVI (antinuclear  antibody) 01/06/2016     Priority: Medium    White matter abnormality on MRI of brain 01/06/2016     Priority: Medium    Ataxia 07/17/2015     Priority: Medium    CHD (coronary heart disease) 05/21/2013     Priority: Medium     S/P stenting      Dyslipidemia 05/21/2013     Priority: Medium    MS (multiple sclerosis) (H) 05/21/2013     Priority: Medium    Obesity 05/21/2013     Priority: Medium    Tobacco abuse 05/21/2013     Priority: Medium    Hypothyroidism 05/21/2013     Priority: Medium    Depression 05/21/2013     Priority: Medium    Advanced care planning/counseling discussion 06/01/2012     Priority: Medium        Past Medical History:    Past Medical History:   Diagnosis Date    CHD, [place] 07/11/2007    Chronic/Recurrent Back Pain 09/20/2000    Depression 04/25/2011    Dyslipidemia 06/02/2000    Hypothyroidism 07/10/2001    Multiple sclerosis (H) 04/18/2001    Nephrolithiasis 01/24/2012    Obesity 01/01/2011    Prediabetes 01/12/2012    Rheumatoid arthritis(714.0) 01/06/2004    Tobacco Abuse 01/01/2011    Vitamin D deficiency 08/14/2012       Past Surgical History:    Past Surgical History:   Procedure Laterality Date    ANGIOPLASTY  07/01/2007    stent to LAD    CARDIAC SURGERY      stent    CHOLECYSTECTOMY      COLONOSCOPY  06/13/2005    repeat colonoscopy in 10 years    ENDOSCOPY UPPER, COLONOSCOPY, COMBINED N/A 12/22/2021    [facility] Endoscopy by Dr. Kamini Quintanilla. Follow-Up Colonoscopy in 1 year due to multiple tubular adenomas removed.Repeat Upper GI in 6 weeks.    MAMMOPLASTY REDUCTION BILATERAL Bilateral 01/01/2005    RELEASE CARPAL TUNNEL Bilateral     TONSILLECTOMY      TUBAL LIGATION Bilateral     UPPER ENDOSCOPY W/ BANDING N/A 02/09/2022    [facility] Endoscopy by Dr. Charbel Hernández. Follow-Up in 3-4 months.       Family History:    Family History   Problem Relation Age of Onset    C.A.D. Mother     Myocardial Infarction Mother         myocardial infarction,  cause of death    STEVE. Father     Myocardial Infarction Father         myocardial infarction, cause of death    Hypertension Brother     Diabetes Sister        Social History:  Marital Status:   [4]  Social History     Tobacco Use    Smoking status: Every Day     Types: Cigarettes     Last attempt to quit: 8/3/2013     Years since quitting: 10.0    Smokeless tobacco: Never    Tobacco comments:     tried to quit yes, yr quit 2011   Substance Use Topics    Alcohol use: Yes        Medications:    cholecalciferol (VITAMIN D3) 5000 UNITS CAPS capsule  cyclobenzaprine (FLEXERIL) 5 MG tablet  levothyroxine (SYNTHROID/LEVOTHROID) 125 MCG tablet  losartan (COZAAR) 25 MG tablet  methylphenidate (RITALIN) 10 MG tablet  rosuvastatin (CRESTOR) 40 MG tablet  sertraline (ZOLOFT) 100 MG tablet  Famotidine (PEPCID PO)  famotidine (PEPCID) 20 MG tablet  glatiramer acetate 40 MG/ML injection          Review of Systems   Constitutional:  Negative for chills and fever.   Musculoskeletal:  Positive for gait problem, joint swelling and myalgias.   Skin:  Negative for color change.   All other systems reviewed and are negative.      Physical Exam   BP: 150/83  Pulse: 63  Temp: 97.8  F (36.6  C)  Resp: 16  SpO2: 93 %      Physical Exam  Vitals and nursing note reviewed.   Constitutional:       General: She is not in acute distress.     Appearance: Normal appearance. She is well-developed.   HENT:      Head: Normocephalic and atraumatic.   Eyes:      Conjunctiva/sclera: Conjunctivae normal.   Cardiovascular:      Rate and Rhythm: Normal rate.   Pulmonary:      Effort: Pulmonary effort is normal. No respiratory distress.   Abdominal:      General: Abdomen is flat.   Musculoskeletal:         General: No deformity.      Cervical back: Normal range of motion and neck supple.      Right knee: No deformity, effusion, erythema, ecchymosis, lacerations, bony tenderness or crepitus. Normal range of motion. Tenderness present over the medial  joint line and MCL. No lateral joint line, LCL, ACL, PCL or patellar tendon tenderness. No LCL laxity or MCL laxity.      Instability Tests: Anterior drawer test negative. Posterior drawer test negative.      Right lower leg: Normal. No swelling. No edema.      Left lower leg: No edema.   Skin:     General: Skin is warm and dry.      Coloration: Skin is not pale.      Findings: No bruising, erythema or lesion.   Neurological:      Mental Status: She is alert and oriented to person, place, and time.         ED Course              ED Course as of 08/26/23 1218   Sat Aug 26, 2023   1201 XR Knee Right 3 Views  FINDINGS:  Bones/joints: Mild Tricompartmental joint space narrowing and osteophyte formation consistent with  degenerative changes. There is no evidence of acute fracture.There is no evidence of malalignment  or dislocation.  Soft tissues: Normal.  IMPRESSION:  1. Mild Tricompartmental joint space narrowing and osteophyte formation consistent with  degenerative changes.  2. There is no evidence of acute fracture.There is no evidence of malalignment or dislocation.       Procedures              Results for orders placed or performed during the hospital encounter of 08/26/23 (from the past 24 hour(s))   XR Knee Right 3 Views    Narrative    XR KNEE RIGHT 3 VIEWS    HISTORY: 62 years Female medial knee pain and swelling x 2 weeks, knee  intermittently gives out. No recent trauma or injury. No history of  surgeries.    COMPARISON: None    TECHNIQUE: Left knee 3 views    FINDINGS: There is joint space narrowing of moderate severity the  patellofemoral and medial compartments. There is mild marginal  osteophytic change of the lateral compartment. There is no evidence of  fracture. No significant joint effusion.      Impression    IMPRESSION: Degenerative osteoarthritic changes of moderate severity.  No evidence of fracture.    ANDERSON HINES MD         SYSTEM ID:  [id number]       Medications - No data to  display    Assessments & Plan (with Medical Decision Making)   62-year-old female that presented for evaluation of medial right knee pain and swelling with knee occasionally giving out with ambulation.  X-ray of her knee today shows osteoarthritic changes with no evidence of a fracture or dislocation.  No significant MCL or LCL laxity.  Negative Jesse's.  I discussed all findings with patient.  Also suspect symptoms are due to her meniscus especially with the patient reporting knee instability.  A knee immobilizer was applied today.  Recommended continuing with ice and elevation.  Tylenol or ibuprofen as needed for pain.  Referral was placed to orthopedics for further evaluation.  Return to urgent care or emergency department for any worsening or concerning symptoms.    I have reviewed the nursing notes.    I have reviewed the findings, diagnosis, plan and need for follow up with the patient.  This document was prepared using a combination of typing and voice generated software.  While every attempt was made for accuracy, spelling and grammatical errors may exist.         New Prescriptions    No medications on file       Final diagnoses:   Right medial knee pain   Pain and swelling of knee, right   Knee instability, right       8/26/2023   [facility]       Mpofu, Prudence, CNP  08/26/23 [id number]

## 2023-08-26 NOTE — ED TRIAGE NOTES
Pt presents with c/o right knee pain  Has been giving out   No known injury.  States that its been ongoing for 2 weeks.   does radiate up and above the knee and down to her ankle, ankle has been swelling also.    Has taken tyl for relief  No otc meds today

## 2023-08-26 NOTE — DISCHARGE INSTRUCTIONS
Your x-ray today does not show any findings that are concerning.  Your symptoms are concerning for either meniscus or ligament injury.  Use the knee immobilizer to your knee.  Continue applying ice packs and elevate the affected leg at rest.  Tylenol or ibuprofen as needed for pain.    Schedule an appointment with orthopedics next week for further evaluation.    Return to urgent care or emergency department for any worsening or concerning symptoms.

## 2023-09-25 NOTE — TELEPHONE ENCOUNTER
Pt called to schedule a pre op appointment, no answer.  PCP okay with a same day slot the first week of October.  Sent pt a MCM to call scheduling to schedule this appointment.

## 2023-09-25 NOTE — TELEPHONE ENCOUNTER
Please see MCM and advise.     Pt needs a pre op appointment for knee surgery 10/20/2023.    Pt would like the first week of October.  PCP has available   10/03/2023 Same Day at 2:30 PM  10/04/2023 Dr only at 4 PM  10/05/2023 Sme day at 10 AM and 2:30 PM, and a DR only at 4 PM.

## 2023-09-26 NOTE — TELEPHONE ENCOUNTER
10:00 AM    Reason for Call: OVERBOOK/PREOP     Patient needs a Preop for Right knee torn meniscus with Dr Romi Acevedo at [facility] on 10/20/23        The patient is requesting an appointment for PREOP with Samina Armendariz.    Was an appointment offered for this call? No  If yes : Appointment type              Date    Preferred method for responding to this message: Telephone Call  What is your phone number ? [phone]    If we cannot reach you directly, may we leave a detailed response at the number you provided? Yes    Can this message wait until your PCP/provider returns, if unavailable today? No,

## 2023-09-26 NOTE — TELEPHONE ENCOUNTER
Harle, Samina N, NP   Caller: Unspecified (Today, 10:00 AM)  Ok to schedule in any SDS    Called patient & left a message to set up a Preop appointment with Samina Armendariz in any same day spot on Samina Armendariz scheduled.

## 2023-10-03 NOTE — PROGRESS NOTES
[facility] - [place]  [address]  [place] [address]  Phone: [phone]  Primary Provider: Sj Ashley  Pre-op Performing Provider: SJ ASHLEY  [id number]  PREOPERATIVE EVALUATION:  Today's date: 10/5/2023    Nahoym is a 62 year old female who presents for a preoperative evaluation.      Surgical Information:  Surgery/Procedure: Right Meniscus Repair  Surgery Location: [facility]  Surgeon: Dr. Acevedo  Surgery Date: 10/20/23  Time of Surgery: TBD  Where patient plans to recover: At home with family  Fax number for surgical facility: [facility]    Assessment & Plan     The proposed surgical procedure is considered INTERMEDIATE risk.    Preop general physical exam  Acute medial meniscus tear, right, initial encounter  Patient was cleared for surgery. EKG without acute finding. Her liver function is a bit elevated with AST of 48 and alk phos of 121, but stable. INR was 1.25-just mildly elevated. Plt 81-baseline around 70-90. Most likely from alcoholic cirrhosis. Cleared for surgery, but I do recommend checking a CBC and CMP the day of surgery.     Hypothyroidism, unspecified type  TSH normal.     MS (multiple sclerosis) (H)  Stable. Will continue follow-up with neurology.     Pure hypercholesterolemia  On Crestor.     Alcoholic cirrhosis of liver without ascites (H)-secondary to alcohol abuse  Stable. Continue follow-up with [facility].     Morbid obesity (H)  BMI 35.     Tobacco abuse  Cessation encouraged. Will avoid smoking the morning of surgery.     Alcohol consumption binge drinking  Cessation encouraged. Will avoid drinking all alcohol several days prior to surgery.     Essential hypertension  Controlled.     Current mild episode of major depressive disorder, unspecified whether recurrent (H24)  Stable.         Risks and Recommendations:  The patient has the following additional risks and recommendations for perioperative complications:  Pulmonary:    - Incentive spirometry  post-op    Patient should hold all medications the morning of surgery except her Synthroid.     RECOMMENDATION:  APPROVAL GIVEN to proceed with proposed procedure, without further diagnostic evaluation.    Kelly Vargas with a functional capacity of greater than four MET's. There are no obvious contraindications to proceeding with surgery at this time. Recommend that the surgeon review risks and benefits of the procedure prior to proceeding. I do recommend her CMP and CBC are rechecked the day of surgery.     Was recommended to avoid eating and drinking anything 12 hours prior to surgery unless his surgeon tells him otherwise.     Subjective       HPI related to upcoming procedure: Patient with chronic right knee pain. Recent knee MRI with small tear in medial meniscus.     MRI-  Impression:   1. Small radial tear posterior horn medial meniscus near the meniscal  root.  2. Degenerative change most severe in the medial compartment.  3. Mild edema adjacent to MCL. This may be reactive related to the  degenerative change and meniscal tear but strain type injury is  difficult to exclude. It is not completely torn or retracted.     SHANA MOORE MD           10/3/2023    12:23 PM   Preop Questions   1. Have you ever had a heart attack or stroke? No   2. Have you ever had surgery on your heart or blood vessels, such as a stent placement, a coronary artery bypass, or surgery on an artery in your head, neck, heart, or legs? YES - Patient had a stent placed years ago    3. Do you have chest pain with activity? No   4. Do you have a history of  heart failure? No   5. Do you currently have a cold, bronchitis or symptoms of other infection? No   6. Do you have a cough, shortness of breath, or wheezing? No   7. Do you or anyone in your family have previous history of blood clots? No   8. Do you or does anyone in your family have a serious bleeding problem such as prolonged bleeding following surgeries or cuts? No   9.  Have you ever had problems with anemia or been told to take iron pills? No   10. Have you had any abnormal blood loss such as black, tarry or bloody stools, or abnormal vaginal bleeding? No   11. Have you ever had a blood transfusion? No   12. Are you willing to have a blood transfusion if it is medically needed before, during, or after your surgery? Yes   13. Have you or any of your relatives ever had problems with anesthesia? No   14. Do you have sleep apnea, excessive snoring or daytime drowsiness? YES - Daytime drowsiness, declines sleep study, twin sister with JOSSELYN   14a. Do you have a CPAP machine? No   15. Do you have any artifical heart valves or other implanted medical devices like a pacemaker, defibrillator, or continuous glucose monitor? No   16. Do you have artificial joints? No   17. Are you allergic to latex? No       Health Care Directive:  Patient does not have a Health Care Directive or Living Will: Patient states has Advance Directive and will bring in a copy to clinic.    Preoperative Review of :   reviewed - no record of controlled substances prescribed.    Has been prescribed Ritalin in the past for her MS.     Status of Chronic Conditions:  DEPRESSION - Patient has a long history of Depression of moderate severity requiring medication for control with recent symptoms being stable. Current symptoms of depression include none. Taking sertraline without side effects.     HYPERLIPIDEMIA - Patient has a long history of significant Hyperlipidemia requiring medication for treatment with recent good control. Patient reports no problems or side effects with the medication. Taking Crestor without side effects.     HYPERTENSION - Patient has longstanding history of HTN , currently denies any symptoms referable to elevated blood pressure. Specifically denies chest pain, palpitations, dyspnea, orthopnea, PND or peripheral edema. Blood pressure readings have been in normal range. Current medication  regimen is as listed below. Patient denies any side effects of medication. Taking losartan 25 mg without side effects.     HYPOTHYROIDISM - Patient has a longstanding history of chronic Hypothyroidism. Patient has been doing well, noting no tremor, insomnia, hair loss or changes in skin texture. Continues to take medications as directed, without adverse reactions or side effects. Last TSH   Lab Results   Component Value Date    TSH 1.07 07/21/2021       MS; follows with neurology, has relapsing remitting MS; was using Copaxone, but stopped 2 years ago, also uses Ritalin rarely.     She does smoke, no interest in quitting.     Alcoholic cirrhosis; continues to binge drink on alcohol several times per week. H/o varices with portal hypertension. Follows with GI. No edema, jaundice, pruritis, melena or hematemesis, or encephalopathy. Normal endoscopy in 7/2023. INR has been mildly elevated in the past.     Mets greater than 4; able to walk a mile without stopping    Due for a mammogram. Willing to get.     Review of Systems  CONSTITUTIONAL: NEGATIVE for fever, chills, change in weight  INTEGUMENTARY/SKIN: NEGATIVE for worrisome rashes, moles or lesions  EYES: NEGATIVE for vision changes or irritation  ENT/MOUTH: NEGATIVE for ear, mouth and throat problems  RESP: NEGATIVE for significant cough or SOB  CV: NEGATIVE for chest pain, palpitations or peripheral edema  GI: NEGATIVE for nausea, abdominal pain, heartburn, or change in bowel habits  : NEGATIVE for frequency, dysuria, or hematuria  MUSCULOSKELETAL:right knee pain  NEURO: NEGATIVE for weakness, dizziness or paresthesias  ENDOCRINE: NEGATIVE for temperature intolerance, skin/hair changes  HEME: NEGATIVE for bleeding problems  PSYCHIATRIC: NEGATIVE for changes in mood or affect    Patient Active Problem List    Diagnosis Date Noted    Alcoholic cirrhosis of liver without ascites (H)-secondary to alcohol abuse 02/01/2022     Priority: Medium    Screening for colon  cancer 06/21/2021     Priority: Medium     Added automatically from request for surgery [id number]      Morbid obesity (H) 07/06/2020     Priority: Medium    Hoarseness of voice 06/19/2020     Priority: Medium     She has seen ENT. Exam negative.       Trigger finger of thumb, unspecified laterality 03/15/2017     Priority: Medium    Atypical squamous cells of undetermined significance on cytologic smear of cervix (ASC-US) 01/19/2016     Priority: Medium     Overview:   HPV negative, recommend repeat cotesting in 3 years      Elevated LFTs 01/19/2016     Priority: Medium    Post-menopausal 01/13/2016     Priority: Medium    Pure hypercholesterolemia 01/13/2016     Priority: Medium    Alcohol consumption binge drinking 01/10/2016     Priority: Medium    Chronic neck and back pain 01/06/2016     Priority: Medium    Degenerative cervical spinal stenosis 01/06/2016     Priority: Medium    Positive MELVI (antinuclear antibody) 01/06/2016     Priority: Medium    White matter abnormality on MRI of brain 01/06/2016     Priority: Medium    Ataxia 07/17/2015     Priority: Medium    CHD (coronary heart disease) 05/21/2013     Priority: Medium     S/P stenting      MS (multiple sclerosis) (H) 05/21/2013     Priority: Medium    Obesity 05/21/2013     Priority: Medium    Tobacco abuse 05/21/2013     Priority: Medium    Hypothyroidism 05/21/2013     Priority: Medium    Depression 05/21/2013     Priority: Medium    Advanced care planning/counseling discussion 06/01/2012     Priority: Medium      Past Medical History:   Diagnosis Date    CHD, [place] 07/11/2007    Chronic/Recurrent Back Pain 09/20/2000    Depression 04/25/2011    Dyslipidemia 06/02/2000    Hypothyroidism 07/10/2001    Multiple sclerosis (H) 04/18/2001    relapsing-remitting    Nephrolithiasis 01/24/2012    Obesity 01/01/2011    Prediabetes 01/12/2012    Rheumatoid arthritis(714.0) 01/06/2004    Tobacco Abuse 01/01/2011    Vitamin D deficiency 08/14/2012     Past  Surgical History:   Procedure Laterality Date    ANGIOPLASTY  07/01/2007    stent to LAD    CARDIAC SURGERY      stent    CHOLECYSTECTOMY      COLONOSCOPY  06/13/2005    repeat colonoscopy in 10 years    ENDOSCOPY UPPER, COLONOSCOPY, COMBINED N/A 12/22/2021    [facility] Endoscopy by Dr. Kamini Quintanilla. Follow-Up Colonoscopy in 1 year due to multiple tubular adenomas removed.Repeat Upper GI in 6 weeks.    MAMMOPLASTY REDUCTION BILATERAL Bilateral 01/01/2005    RELEASE CARPAL TUNNEL Bilateral     TONSILLECTOMY      TUBAL LIGATION Bilateral     UPPER ENDOSCOPY W/ BANDING N/A 02/09/2022    [facility] Endoscopy by Dr. Charbel Hernández. Follow-Up in 3-4 months.     Current Outpatient Medications   Medication Sig Dispense Refill    cholecalciferol (VITAMIN D3) 5000 UNITS CAPS capsule Take 1 capsule by mouth daily      famotidine (PEPCID) 20 MG tablet Take 1 tablet (20 mg) by mouth nightly as needed 90 tablet 1    glatiramer acetate 40 MG/ML injection       levothyroxine (SYNTHROID/LEVOTHROID) 125 MCG tablet Take 1 tablet (125 mcg) by mouth daily 90 tablet 1    losartan (COZAAR) 25 MG tablet Take 1 tablet (25 mg) by mouth daily 90 tablet 3    methylphenidate (RITALIN) 10 MG tablet Take 10 mg by mouth as needed       rosuvastatin (CRESTOR) 40 MG tablet Take 1 tablet (40 mg) by mouth daily 90 tablet 3    sertraline (ZOLOFT) 100 MG tablet Take 1 tablet (100 mg) by mouth daily 90 tablet 1       Allergies   Allergen Reactions    Plaquenil [Hydroxychloroquine Sulfate] Rash        Social History     Tobacco Use    Smoking status: Every Day     Packs/day: 0.50     Years: 30.00     Pack years: 15.00     Types: Cigarettes     Last attempt to quit: 8/3/2013     Years since quitting: 10.1    Smokeless tobacco: Never    Tobacco comments:     tried to quit yes, yr quit 2011   Substance Use Topics    Alcohol use: Yes     Family History   Problem Relation Age of Onset    C.A.D. Mother     Myocardial Infarction  Mother         myocardial infarction, cause of death    C.A.D. Father     Myocardial Infarction Father         myocardial infarction, cause of death    Hypertension Brother     Diabetes Sister      History   Drug Use Unknown         Objective     /80 (BP Location: Left arm, Patient Position: Sitting, Cuff Size: Adult Large)   Pulse 63   Temp 97.2  F (36.2  C) (Tympanic)   Wt 79.4 kg (175 lb)   SpO2 97%   BMI 34.18 kg/m      Physical Exam    GENERAL APPEARANCE: healthy, alert, active, no distress, and over weight     EYES: EOMI, PERRL     HENT: ear canals and TM's normal and nose and mouth without ulcers or lesions     NECK: no adenopathy, no asymmetry, masses, or scars and thyroid normal to palpation     RESP: lungs clear to auscultation - no rales, rhonchi or wheezes     CV: regular rates and rhythm, normal S1 S2, no S3 or S4 and no murmur, click or rub     ABDOMEN:  soft, nontender, no distension, no HSM or masses and bowel sounds normal     MS: extremities normal- no gross deformities noted, no evidence of inflammation in joints, FROM in all extremities.     SKIN: no suspicious lesions or rashes     NEURO: Normal strength and tone, sensory exam grossly normal, mentation intact and speech normal     PSYCH: mentation appears normal. and affect normal/bright     LYMPHATICS: No cervical adenopathy    Recent Labs   Lab Test 07/21/23  0842   INR 1.3*        Diagnostics:  Recent Results (from the past 24 hour(s))   EKG 12-lead complete w/read - (Clinic Performed)    Collection Time: 10/05/23 10:01 AM   Result Value Ref Range    Systolic Blood Pressure  mmHg    Diastolic Blood Pressure  mmHg    Ventricular Rate 58 BPM    Atrial Rate 58 BPM    NV Interval 158 ms    QRS Duration 94 ms     ms    QTc 443 ms    P Axis 15 degrees    R AXIS 11 degrees    T Axis 34 degrees    Interpretation ECG       Sinus bradycardia  Anterior infarct , age undetermined  Abnormal ECG  When compared with ECG of 18-JUL-2007  10:19,  Nonspecific T wave abnormality no longer evident in Lateral leads     Comprehensive metabolic panel (BMP + Alb, Alk Phos, ALT, AST, Total. Bili, TP)    Collection Time: 10/05/23 10:27 AM   Result Value Ref Range    Sodium 138 135 - 145 mmol/L    Potassium 3.7 3.4 - 5.3 mmol/L    Carbon Dioxide (CO2) 24 22 - 29 mmol/L    Anion Gap 10 7 - 15 mmol/L    Urea Nitrogen 11.5 8.0 - 23.0 mg/dL    Creatinine 0.59 0.51 - 0.95 mg/dL    GFR Estimate >90 >60 mL/min/1.73m2    Calcium 9.6 8.8 - 10.2 mg/dL    Chloride 104 98 - 107 mmol/L    Glucose 106 (H) 70 - 99 mg/dL    Alkaline Phosphatase 121 (H) 35 - 104 U/L    AST 48 (H) 0 - 45 U/L    ALT 35 0 - 50 U/L    Protein Total 7.0 6.4 - 8.3 g/dL    Albumin 3.7 3.5 - 5.2 g/dL    Bilirubin Total 1.5 (H) <=1.2 mg/dL   Lipid Profile (Chol, Trig, HDL, LDL calc)    Collection Time: 10/05/23 10:27 AM   Result Value Ref Range    Cholesterol 227 (H) <200 mg/dL    Triglycerides 92 <150 mg/dL    Direct Measure HDL 62 >=50 mg/dL    LDL Cholesterol Calculated 147 (H) <=100 mg/dL    Non HDL Cholesterol 165 (H) <130 mg/dL   Reflex INR    Collection Time: 10/05/23 10:27 AM   Result Value Ref Range    INR 1.26 (H) 0.85 - 1.15   TSH with free T4 reflex    Collection Time: 10/05/23 10:27 AM   Result Value Ref Range    TSH 3.17 0.30 - 4.20 uIU/mL   CBC with platelets and differential    Collection Time: 10/05/23 10:27 AM   Result Value Ref Range    WBC Count 4.4 4.0 - 11.0 10e3/uL    RBC Count 4.02 3.80 - 5.20 10e6/uL    Hemoglobin 14.1 11.7 - 15.7 g/dL    Hematocrit 41.4 35.0 - 47.0 %     (H) 78 - 100 fL    MCH 35.1 (H) 26.5 - 33.0 pg    MCHC 34.1 31.5 - 36.5 g/dL    RDW 14.6 10.0 - 15.0 %    Platelet Count 81 (L) 150 - 450 10e3/uL    % Neutrophils 50 %    % Lymphocytes 36 %    % Monocytes 11 %    Mids % (Monos, Eos, Basos)      % Eosinophils 3 %    % Basophils 0 %    % Immature Granulocytes 0 %    NRBCs per 100 WBC 0 <1 /100    Absolute Neutrophils 2.1 1.6 - 8.3 10e3/uL    Absolute  Lymphocytes 1.6 0.8 - 5.3 10e3/uL    Absolute Monocytes 0.5 0.0 - 1.3 10e3/uL    Mids Abs (Monos, Eos, Basos)      Absolute Eosinophils 0.1 0.0 - 0.7 10e3/uL    Absolute Basophils 0.0 0.0 - 0.2 10e3/uL    Absolute Immature Granulocytes 0.0 <=0.4 10e3/uL    Absolute NRBCs 0.0 10e3/uL        EKG: appears normal, NSR, normal axis, normal intervals, no acute ST/T changes c/w ischemia, no LVH by voltage criteria, unchanged from previous tracings    Revised Cardiac Risk Index (RCRI):  The patient has the following serious cardiovascular risks for perioperative complications:   - No serious cardiac risks = 0 points     RCRI Interpretation: 0 points: Class I (very low risk - 0.4% complication rate)         Signed Electronically by: Samina Armendariz NP  Copy of this evaluation report is provided to requesting physician.

## 2023-12-18 NOTE — ED TRIAGE NOTES
Pt in for evaluation of abdominal pain. Reports onset was last night at 10pm. Pain in the mid upper radiating down the abdomen to the pelvis. No changes in pain with positions. Emesis x3 since onset of sx.

## 2023-12-18 NOTE — ED NOTES
Patient presents for abdominal pain and vomiting which began about 10PM last night.     Denies fever, chill, change in urine habits, change in bowel habits

## 2023-12-18 NOTE — ED PROVIDER NOTES
"  History     Chief Complaint   Patient presents with    Abdominal Pain     HPI  Kelly Vargas is a 63 year old female who comes to the emergency department complaining of right-sided and upper abdominal pain which started about 10:00 last night.  Patient states that the pain radiates into her flanks.  She has been nauseous and she is vomited 3 times since the onset of her discomfort.  She is not a fevers or chills.  She is not hematuria or dysuria.  She has not had diarrhea or constipation.  Patient states she does have a history of \"liver disease\".  She has apparently had to have paracentesis in the past.  Last time she had that was in November.    Allergies:  Allergies   Allergen Reactions    Plaquenil [Hydroxychloroquine Sulfate] Rash       Problem List:    Patient Active Problem List    Diagnosis Date Noted    Alcoholic cirrhosis of liver without ascites (H)-secondary to alcohol abuse 02/01/2022     Priority: Medium    Screening for colon cancer 06/21/2021     Priority: Medium     Added automatically from request for surgery [id number]      Morbid obesity (H) 07/06/2020     Priority: Medium    Hoarseness of voice 06/19/2020     Priority: Medium     She has seen ENT. Exam negative.       Trigger finger of thumb, unspecified laterality 03/15/2017     Priority: Medium    Atypical squamous cells of undetermined significance on cytologic smear of cervix (ASC-US) 01/19/2016     Priority: Medium     Overview:   HPV negative, recommend repeat cotesting in 3 years      Elevated LFTs 01/19/2016     Priority: Medium    Post-menopausal 01/13/2016     Priority: Medium    Pure hypercholesterolemia 01/13/2016     Priority: Medium    Alcohol consumption binge drinking 01/10/2016     Priority: Medium    Chronic neck and back pain 01/06/2016     Priority: Medium    Degenerative cervical spinal stenosis 01/06/2016     Priority: Medium    Positive MELVI (antinuclear antibody) 01/06/2016     Priority: Medium    White matter " abnormality on MRI of brain 01/06/2016     Priority: Medium    Ataxia 07/17/2015     Priority: Medium    CHD (coronary heart disease) 05/21/2013     Priority: Medium     S/P stenting      MS (multiple sclerosis) (H) 05/21/2013     Priority: Medium    Obesity 05/21/2013     Priority: Medium    Tobacco abuse 05/21/2013     Priority: Medium    Hypothyroidism 05/21/2013     Priority: Medium    Depression 05/21/2013     Priority: Medium    Advanced care planning/counseling discussion 06/01/2012     Priority: Medium        Past Medical History:    Past Medical History:   Diagnosis Date    CHD, [place] 07/11/2007    Chronic/Recurrent Back Pain 09/20/2000    Depression 04/25/2011    Dyslipidemia 06/02/2000    Hypothyroidism 07/10/2001    Multiple sclerosis (H) 04/18/2001    Nephrolithiasis 01/24/2012    Obesity 01/01/2011    Prediabetes 01/12/2012    Rheumatoid arthritis(714.0) 01/06/2004    Tobacco Abuse 01/01/2011    Vitamin D deficiency 08/14/2012       Past Surgical History:    Past Surgical History:   Procedure Laterality Date    ANGIOPLASTY  07/01/2007    stent to LAD    CARDIAC SURGERY      stent    CHOLECYSTECTOMY      COLONOSCOPY  06/13/2005    repeat colonoscopy in 10 years    ENDOSCOPY UPPER, COLONOSCOPY, COMBINED N/A 12/22/2021    [facility] Endoscopy by Dr. Kamini Quintanilla. Follow-Up Colonoscopy in 1 year due to multiple tubular adenomas removed.Repeat Upper GI in 6 weeks.    MAMMOPLASTY REDUCTION BILATERAL Bilateral 01/01/2005    RELEASE CARPAL TUNNEL Bilateral     TONSILLECTOMY      TUBAL LIGATION Bilateral     UPPER ENDOSCOPY W/ BANDING N/A 02/09/2022    [facility] Endoscopy by Dr. Charbel Hernández. Follow-Up in 3-4 months.       Family History:    Family History   Problem Relation Age of Onset    C.A.D. Mother     Myocardial Infarction Mother         myocardial infarction, cause of death    C.A.D. Father     Myocardial Infarction Father         myocardial infarction, cause of  death    Hypertension Brother     Diabetes Sister        Social History:  Marital Status:   [4]  Social History     Tobacco Use    Smoking status: Every Day     Packs/day: 0.50     Years: 30.00     Additional pack years: 0.00     Total pack years: 15.00     Types: Cigarettes     Last attempt to quit: 8/3/2013     Years since quitting: 10.3    Smokeless tobacco: Never    Tobacco comments:     tried to quit yes, yr quit 2011   Vaping Use    Vaping Use: Never used   Substance Use Topics    Alcohol use: Yes    Drug use: Never        Medications:    rosuvastatin (CRESTOR) 40 MG tablet  sertraline (ZOLOFT) 100 MG tablet  cholecalciferol (VITAMIN D3) 5000 UNITS CAPS capsule  famotidine (PEPCID) 20 MG tablet  glatiramer acetate 40 MG/ML injection  levothyroxine (SYNTHROID/LEVOTHROID) 125 MCG tablet  losartan (COZAAR) 25 MG tablet  methylphenidate (RITALIN) 10 MG tablet          Review of Systems   Constitutional: Negative.    HENT: Negative.     Eyes: Negative.    Respiratory: Negative.     Cardiovascular: Negative.    Gastrointestinal:  Positive for abdominal pain, nausea and vomiting.   Endocrine: Negative.    Genitourinary: Negative.    Musculoskeletal: Negative.    Skin: Negative.    Neurological: Negative.    All other systems reviewed and found unremarkable    Physical Exam   BP: (!) 178/152  Pulse: 76  Temp: 98.5  F (36.9  C)  Resp: 22  Weight: 73.9 kg (162 lb 14.4 oz)  SpO2: 96 %      Physical Exam 63-year-old female who is awake alert oriented person place and time.  Very pleasant and cooperative with my exam.  HEENT normocephalic extraocular muscles intact pupils equally round and reactive to light.  Tongue midline palate intact oropharynx is clear.  Neck is supple his range of motion without pain.  Lungs are clear bilaterally.  Heart maintains a regular rate and rhythm S1-S2 sounds are appreciated.  Abdomen is soft with no rebound or involuntary guarding.  Patient does have moderate voluntary guarding to  palpation of right side of her abdomen.  She also some tenderness to percussion on the right flank.  Extremities a full range of motion 5 out of 5 strength peripheral pulses brisk capillary refill no sensory deficit.  Dermatologic exam no diffuse skin rashes or lesions.  Neurologic exam no focal cranial nerve deficit is appreciated.    ED Course      EKG is obtained and interpreted by myself.  Shows normal sinus rhythm.  Ventricular 70 bpm.  Parables 166 ms.  Corrected QT is 4 and 77 ms.  There is no ST segment ovation or depression.  No inappropriate T wave inversion.  There does not appear to be evidence of acute ischemic change        ED Course as of 12/18/23 1207   Mon Dec 18, 2023   1203 Discussed the case with Dr. Mcdaniel, gastroenterologist on-call Yaya Phan.  Also discussed the case with Dr. Tanner who is the on-call hospitalist.  Dr. Tanner very graciously agreed to accept the patient in transfer for admission.                         Results for orders placed or performed during the hospital encounter of 12/18/23 (from the past 24 hour(s))   [place] Draw *Canceled*    Narrative    The following orders were created for panel order [place] Draw.  Procedure                               Abnormality         Status                     ---------                               -----------         ------                       Please view results for these tests on the individual orders.   UA with Microscopic reflex to Culture    Specimen: Urine, Midstream   Result Value Ref Range    Color Urine Yellow Colorless, Straw, Light Yellow, Yellow    Appearance Urine Slightly Cloudy (A) Clear    Glucose Urine Negative Negative mg/dL    Bilirubin Urine Negative Negative    Ketones Urine Trace (A) Negative mg/dL    Specific Gravity Urine 1.022 1.003 - 1.035    Blood Urine Large (A) Negative    pH Urine 5.5 4.7 - 8.0    Protein Albumin Urine 30 (A) Negative mg/dL    Urobilinogen Urine Normal Normal, 2.0 mg/dL     Nitrite Urine Negative Negative    Leukocyte Esterase Urine Negative Negative    Bacteria Urine Few (A) None Seen /HPF    Mucus Urine Present (A) None Seen /LPF    RBC Urine 11 (H) <=2 /HPF    WBC Urine 2 <=5 /HPF    Squamous Epithelials Urine 7 (H) <=1 /HPF    Hyaline Casts Urine 7 (H) <=2 /LPF    Narrative    Urine Culture not indicated   CBC with Platelets & Differential    Narrative    The following orders were created for panel order CBC with Platelets & Differential.  Procedure                               Abnormality         Status                     ---------                               -----------         ------                     CBC with platelets and d...[[id number]]  Abnormal            Final result                 Please view results for these tests on the individual orders.   Comprehensive metabolic panel   Result Value Ref Range    Sodium 134 (L) 135 - 145 mmol/L    Potassium 4.3 3.4 - 5.3 mmol/L    Carbon Dioxide (CO2) 21 (L) 22 - 29 mmol/L    Anion Gap 13 7 - 15 mmol/L    Urea Nitrogen 20.5 8.0 - 23.0 mg/dL    Creatinine 0.79 0.51 - 0.95 mg/dL    GFR Estimate 83 >60 mL/min/1.73m2    Calcium 9.8 8.8 - 10.2 mg/dL    Chloride 100 98 - 107 mmol/L    Glucose 154 (H) 70 - 99 mg/dL    Alkaline Phosphatase 124 40 - 150 U/L    AST 40 0 - 45 U/L    ALT 31 0 - 50 U/L    Protein Total 7.3 6.4 - 8.3 g/dL    Albumin 3.9 3.5 - 5.2 g/dL    Bilirubin Total 1.4 (H) <=1.2 mg/dL   [place] Draw *Canceled*    Narrative    The following orders were created for panel order [place] Draw.  Procedure                               Abnormality         Status                     ---------                               -----------         ------                     Extra Red Top Tube[[id number]]                                                          Extra Green Top (Lithium...[[id number]]                                                 Extra Purple Top Tube[[id number]]                                                          Please view results for these tests on the individual orders.   CBC with platelets and differential   Result Value Ref Range    WBC Count 10.1 4.0 - 11.0 10e3/uL    RBC Count 3.93 3.80 - 5.20 10e6/uL    Hemoglobin 14.1 11.7 - 15.7 g/dL    Hematocrit 39.6 35.0 - 47.0 %     (H) 78 - 100 fL    MCH 35.9 (H) 26.5 - 33.0 pg    MCHC 35.6 31.5 - 36.5 g/dL    RDW 13.9 10.0 - 15.0 %    Platelet Count 105 (L) 150 - 450 10e3/uL    % Neutrophils 76 %    % Lymphocytes 13 %    % Monocytes 10 %    % Eosinophils 0 %    % Basophils 0 %    % Immature Granulocytes 1 %    NRBCs per 100 WBC 0 <1 /100    Absolute Neutrophils 7.7 1.6 - 8.3 10e3/uL    Absolute Lymphocytes 1.3 0.8 - 5.3 10e3/uL    Absolute Monocytes 1.0 0.0 - 1.3 10e3/uL    Absolute Eosinophils 0.0 0.0 - 0.7 10e3/uL    Absolute Basophils 0.0 0.0 - 0.2 10e3/uL    Absolute Immature Granulocytes 0.1 <=0.4 10e3/uL    Absolute NRBCs 0.0 10e3/uL   Extra Tube    Narrative    The following orders were created for panel order Extra Tube.  Procedure                               Abnormality         Status                     ---------                               -----------         ------                     Extra Blue Top Tube[[id number]]                              Final result               Extra Red Top Tube[[id number]]                               Final result               Extra Heparinized Syringe[[id number]]                        Final result                 Please view results for these tests on the individual orders.   Extra Blue Top Tube   Result Value Ref Range    Hold Specimen JIC    Extra Red Top Tube   Result Value Ref Range    Hold Specimen JIC    Extra Heparinized Syringe   Result Value Ref Range    Hold Specimen JIC    INR   Result Value Ref Range    INR 1.40 (H) 0.85 - 1.15   EKG 12-lead, tracing only   Result Value Ref Range    Systolic Blood Pressure  mmHg    Diastolic Blood Pressure  mmHg    Ventricular Rate 70 BPM    Atrial Rate 70 BPM    AK  Interval 166 ms    QRS Duration 90 ms     ms    QTc 477 ms    P Axis 13 degrees    R AXIS 14 degrees    T Axis 41 degrees    Interpretation ECG       Sinus rhythm  Normal ECG  When compared with ECG of 05-OCT-2023 10:01,  No significant change was found     CT Abdomen Pelvis w Contrast    Narrative    EXAM: CT ABDOMEN PELVIS W CONTRAST 12/18/2023 10:24 AM    HISTORY: right sided abdominal pain    COMPARISON: Abdominal ultrasound 6/15/2021    TECHNIQUE:   Imaging protocol: Computed tomography of the abdomen and pelvis with  imaging acquired after administration of intravenous contrast. Meds  given: Isovue 370 79ml Given.  Acquisition: This CT exam was performed using one or more the  following dose reduction techniques: automated exposure control,  adjustment of the mA and/or kV according to patient size, and/or  iterative reconstruction technique.    FINDINGS:    LOWER CHEST:  Bibasilar atelectasis. No suspicious pulmonary nodules or masses.    ABDOMEN/PELVIS:  LIVER: Normal contour. No suspicious liver lesions. Cirrhotic  configuration of liver with nodular atrophic contour.  GALLBLADDER: Surgically absent.  BILE DUCTS: No biliary tract dilatation.  PANCREAS: Within normal limits.  SPLEEN: Within normal limits.  ADRENALS: Within normal limits.    KIDNEYS/URETERS: No suspicious mass, nephrolithiasis, or  hydronephrosis.  URINARY BLADDER: Within normal limits.  REPRODUCTIVE ORGANS: No pelvic masses.    BOWEL: No bowel dilatation. Segmental small and large bowel wall  thickening of the ascending through descending colon and distal small  bowel.  PERITONEUM/RETROPERITONEUM: No free air. Moderate volume ascites.  VESSELS: Atherosclerotic calcification of the aorta without aneurysmal  dilation. Recanalized umbilical vein. Venous varices along the  anterior upper abdominal wall. Filling defect within the main portal  vein and SMV, likely representing nonocclusive thrombus (series 3  image 55).   LYMPH NODES:  Prominent lymph nodes at the julianne hepatis. Shotty upper  abdominal retroperitoneal lymph nodes.    BONES AND SOFT TISSUES:  No suspicious osseous lesions. Degenerative periarticular changes and  spinal spondylosis.      Impression    IMPRESSION:  1.  Cirrhotic configuration of liver with findings of portal  hypertension, including recanalized umbilical vein, abdominal wall  varices, moderate ascites.   2.  Partially occlusive venous thrombus within the main portal vein  and SMV.  3.  Small and large bowel wall thickening, likely sequela of portal  hypertensive enteropathy and colopathy, likely due at least in part to  SMV and portal vein thrombus. Infectious or inflammatory change is not  entirely excluded.    MARIELENA POOLE MD         SYSTEM ID:  [id number]       Medications   fentaNYL (PF) (SUBLIMAZE) injection 75 mcg (75 mcg Intravenous $Given 12/18/23 0939)   iopamidol (ISOVUE-370) solution 79 mL (79 mLs Intravenous $Given 12/18/23 1019)   sodium chloride (PF) 0.9% PF flush 50 mL (50 mLs Intravenous $Given 12/18/23 1019)   fentaNYL (PF) (SUBLIMAZE) injection 50 mcg (50 mcg Intravenous $Given 12/18/23 1155)       Assessments & Plan (with Medical Decision Making)     I have reviewed the nursing notes.    The plan is to transfer the patient to [facility] for admission to the hospitalist service          Medical Decision Making  The patient's presentation was of moderate complexity (an acute illness with systemic symptoms).    The patient's evaluation involved:  ordering and/or review of 3+ test(s) in this encounter (see separate area of note for details)    The patient's management necessitated high risk (a decision regarding hospitalization).        New Prescriptions    No medications on file       Final diagnoses:   Right upper quadrant abdominal pain   Alcoholic cirrhosis of liver with ascites (H)       12/18/2023   [facility]       Jermaine Lund MD  12/18/23 5308

## 2023-12-18 NOTE — ED NOTES
"Transferred to [facility] at this time. Report called to KUSUM Jackson at [facility]    Patient was transferred to [facility]  at approximately 3:32 PM via [facility].   Patient status stable. Patient's most recent vitals: Blood pressure 112/65, pulse 72, temperature 98.5  F (36.9  C), temperature source Tympanic, resp. rate 22, weight 76.2 kg (167 lb 15.9 oz), SpO2 94%.  Pt transferred with intact IV.Yes  Intact IV site at Right hand    Other LDA\"s: none  Belongings were sent with Patient  AVS and pertinent records sent with patient. .Yes   Report given to Delmy MAYS and KUSUM Jackson in SBAR format.   "

## 2023-12-21 NOTE — TELEPHONE ENCOUNTER
9:10 AM    Reason for Call: OVERBOOK    Patient is having the following symptoms: hospital follow up/ DISCHARGE 12/21/ liver disease and abdominal pain    The patient is requesting an appointment for within a week of discharge with Marcello.    Was an appointment offered for this call? No  If yes : Appointment type              Date    Preferred method for responding to this message: Telephone Call  What is your phone number ? [phone]    If we cannot reach you directly, may we leave a detailed response at the number you provided? Yes    Can this message wait until your PCP/provider returns, if unavailable today? Not applicable    Sarah Fong

## 2023-12-21 NOTE — TELEPHONE ENCOUNTER
Attempt # 1  Outcome: Left Message   Comment: lvm for patient to call back to schedule - please see providers message below for when. Thank you.

## 2023-12-22 NOTE — TELEPHONE ENCOUNTER
10:45 AM    Reason for Call: OVERBOOK    Patient is having the following symptoms: Hospital Follow Up, Discharged 12/22/23. Needs follow up appt in next week or two.     The patient is requesting an appointment for Soonest available with Samina Armendariz.    Was an appointment offered for this call? No    Preferred method for responding to this message: Telephone Call  What is your phone number [phone]    If we cannot reach you directly, may we leave a detailed response at the number you provided? Yes    Can this message wait until your PCP/provider returns, if unavailable today? Not applicable    Kary Velásquez

## 2023-12-28 NOTE — TELEPHONE ENCOUNTER
12:21 PM    Reason for Call: Phone Call    Description: Patient has appt next Friday but she's sleeping a lot and not feeling well. Back home now. Sister called to see if Nahomy can be seen sooner.     Was an appointment offered for this call? No    Preferred method for responding to this message: Telephone Call  What is your phone number [phone] (Florina Brown, Sister)     If we cannot reach you directly, may we leave a detailed response at the number you provided? Yes    Can this message wait until your PCP/provider returns, if available today? Not applicable    Kary Velásquez

## 2023-12-28 NOTE — TELEPHONE ENCOUNTER
[facility] for sister, Florina Brown: [phone] per below  Will triage on call back

## 2023-12-29 NOTE — TELEPHONE ENCOUNTER
Writer spoke to sister on the phone this a.m.  Sister states she did not call the this clinic yesterday  States patient has been in [facility] since 12/18  Patient does not need anything from the clinic at this time

## 2024-11-05 NOTE — PROGRESS NOTES
Patient presented to the clinic with a letter stating that she was not fully vaccinated against the shingle virus. Per letter is was recommended she receives a follow up dose to ensure adequate coverage.    Previous immunization for shingrix deleted- 8/5/21 possibly diluent only     Shingrix vaccine was administered .   
right leg weakness